# Patient Record
Sex: MALE | Race: BLACK OR AFRICAN AMERICAN | NOT HISPANIC OR LATINO | Employment: FULL TIME | URBAN - METROPOLITAN AREA
[De-identification: names, ages, dates, MRNs, and addresses within clinical notes are randomized per-mention and may not be internally consistent; named-entity substitution may affect disease eponyms.]

---

## 2023-04-24 ENCOUNTER — OFFICE VISIT (OUTPATIENT)
Dept: PRIMARY CARE | Facility: CLINIC | Age: 53
End: 2023-04-24
Payer: COMMERCIAL

## 2023-04-24 VITALS
SYSTOLIC BLOOD PRESSURE: 130 MMHG | HEART RATE: 70 BPM | DIASTOLIC BLOOD PRESSURE: 80 MMHG | BODY MASS INDEX: 35.29 KG/M2 | WEIGHT: 253 LBS

## 2023-04-24 DIAGNOSIS — D72.819 LEUKOPENIA, UNSPECIFIED TYPE: ICD-10-CM

## 2023-04-24 DIAGNOSIS — Z12.11 ENCOUNTER FOR SCREENING COLONOSCOPY: ICD-10-CM

## 2023-04-24 DIAGNOSIS — E78.2 HYPERLIPEMIA, MIXED: ICD-10-CM

## 2023-04-24 DIAGNOSIS — I10 BENIGN ESSENTIAL HYPERTENSION: ICD-10-CM

## 2023-04-24 DIAGNOSIS — R74.8 ABNORMAL ALKALINE PHOSPHATASE TEST: ICD-10-CM

## 2023-04-24 DIAGNOSIS — K62.5 RECTAL BLEEDING: Primary | ICD-10-CM

## 2023-04-24 DIAGNOSIS — N52.9 INABILITY TO ATTAIN ERECTION: ICD-10-CM

## 2023-04-24 DIAGNOSIS — J45.20 MILD INTERMITTENT ASTHMA WITHOUT COMPLICATION (HHS-HCC): ICD-10-CM

## 2023-04-24 DIAGNOSIS — R14.0 GASSINESS: ICD-10-CM

## 2023-04-24 DIAGNOSIS — M62.830 SPASM OF LUMBAR PARASPINOUS MUSCLE: ICD-10-CM

## 2023-04-24 PROBLEM — Z00.00 ANNUAL PHYSICAL EXAM: Status: ACTIVE | Noted: 2023-04-24

## 2023-04-24 PROBLEM — J45.909 ASTHMA (HHS-HCC): Status: ACTIVE | Noted: 2023-04-24

## 2023-04-24 PROCEDURE — 3075F SYST BP GE 130 - 139MM HG: CPT | Performed by: FAMILY MEDICINE

## 2023-04-24 PROCEDURE — 20553 NJX 1/MLT TRIGGER POINTS 3/>: CPT | Performed by: FAMILY MEDICINE

## 2023-04-24 PROCEDURE — 3079F DIAST BP 80-89 MM HG: CPT | Performed by: FAMILY MEDICINE

## 2023-04-24 PROCEDURE — 99215 OFFICE O/P EST HI 40 MIN: CPT | Performed by: FAMILY MEDICINE

## 2023-04-24 PROCEDURE — 1036F TOBACCO NON-USER: CPT | Performed by: FAMILY MEDICINE

## 2023-04-24 RX ORDER — OLMESARTAN MEDOXOMIL AND HYDROCHLOROTHIAZIDE 40/25 40; 25 MG/1; MG/1
1 TABLET ORAL DAILY
Qty: 90 TABLET | Refills: 1 | Status: SHIPPED | OUTPATIENT
Start: 2023-04-24 | End: 2024-02-15 | Stop reason: SDUPTHER

## 2023-04-24 RX ORDER — TADALAFIL 20 MG/1
TABLET ORAL
COMMUNITY
Start: 2020-08-19 | End: 2023-04-24 | Stop reason: SDUPTHER

## 2023-04-24 RX ORDER — ROSUVASTATIN CALCIUM 40 MG/1
40 TABLET, COATED ORAL DAILY
COMMUNITY
End: 2023-04-24 | Stop reason: SDUPTHER

## 2023-04-24 RX ORDER — TADALAFIL 20 MG/1
TABLET ORAL
Qty: 15 TABLET | Refills: 5 | Status: SHIPPED | OUTPATIENT
Start: 2023-04-24 | End: 2024-02-15 | Stop reason: SDUPTHER

## 2023-04-24 RX ORDER — MONTELUKAST SODIUM 10 MG/1
1 TABLET ORAL DAILY
COMMUNITY
Start: 2013-12-23

## 2023-04-24 RX ORDER — ALBUTEROL SULFATE 90 UG/1
AEROSOL, METERED RESPIRATORY (INHALATION)
COMMUNITY
Start: 2013-12-23 | End: 2024-02-15 | Stop reason: ALTCHOICE

## 2023-04-24 RX ORDER — OLMESARTAN MEDOXOMIL AND HYDROCHLOROTHIAZIDE 40/25 40; 25 MG/1; MG/1
1 TABLET ORAL DAILY
COMMUNITY
End: 2023-04-24 | Stop reason: SDUPTHER

## 2023-04-24 RX ORDER — ROSUVASTATIN CALCIUM 40 MG/1
40 TABLET, COATED ORAL DAILY
Qty: 90 TABLET | Refills: 1 | Status: SHIPPED | OUTPATIENT
Start: 2023-04-24 | End: 2024-02-15 | Stop reason: SDUPTHER

## 2023-04-24 RX ORDER — SILDENAFIL 100 MG/1
100 TABLET, FILM COATED ORAL DAILY PRN
Qty: 30 TABLET | Refills: 5 | Status: SHIPPED | OUTPATIENT
Start: 2023-04-24 | End: 2024-02-15 | Stop reason: ALTCHOICE

## 2023-04-24 ASSESSMENT — PATIENT HEALTH QUESTIONNAIRE - PHQ9
2. FEELING DOWN, DEPRESSED OR HOPELESS: NOT AT ALL
SUM OF ALL RESPONSES TO PHQ9 QUESTIONS 1 AND 2: 0
1. LITTLE INTEREST OR PLEASURE IN DOING THINGS: NOT AT ALL

## 2023-04-24 NOTE — ASSESSMENT & PLAN NOTE
Talked about a plethora of etiologies including fissure, hemorrhoids, polyps, pathology such as cancer  Plan for screening colonoscopy, so I will send requisition to GI, who will contact you and schedule appointment at your convenience    I do not believe that the bleeding was secondary to the use of gabapentin

## 2023-04-24 NOTE — ASSESSMENT & PLAN NOTE
Provided you with trigger point injections, hoping for both acute and long-term relief as it provided excellent benefit for you in the past

## 2023-04-24 NOTE — ASSESSMENT & PLAN NOTE
Sent over both Viagra and Cialis, can use interchangeably  Risks, benefits, and options of treatment(s) were discussed after reviewing all current medication(s) and drug allergy(ies)  I opted for the treatment that we discussed with instructions on the medication use for your underlying medical ailment(s)

## 2023-04-24 NOTE — ASSESSMENT & PLAN NOTE
You tend to run on the low side of normal, sometimes below normal with your WBC, so I will repeat that with your blood work which you anticipate having done within the next few days

## 2023-04-24 NOTE — PROGRESS NOTES
Subjective   Patient ID: Eugene Ovalles is a 52 y.o. male who presents for Rectal Bleeding (3 days with blood in stool, stopped taking gabapentin and then it cleared up).    HPI  1.  Rectal bleeding.  Had surgery to the left lower extremity in the past, so was placed on gabapentin  He is taking gabapentin up to about a year ago when he discontinued it on his own  Over the past week or so, the pain started to become exacerbated so he started taking it again for about 3 days, but noticed that he had some rectal bleeding so he stopped the medication, and the rectal bleeding has also stopped  Claims that he had a colonoscopy about 10 years ago, but I do not have record of it  Cologuard was done in November 2021, noted to be negative  Admits that he has been a bit gassy as of late, and feels like he does not always completely empty his bowels    2.  Hypertension.  Normotensive in intake, no reported chest pain or shortness of breath    3.  Hyperlipidemia.  Currently taking rosuvastatin, last lipid panel in September, was uncontrolled with cholesterol 223 and     4.  Intermittent asthma.  Currently stable, uses albuterol intermittently, montelukast daily    5.  Erectile dysfunction.  Requesting refill of both Viagra and Cialis, hoping to use those interchangeably because solitaire late, they do not seem to help    6.  Alkaline phosphatase elevation.  In 2019, alkaline phosphatase was 123, the only elevation in about 6 blood draws that we have    7.  Leukopenia.  WBC has been a bit low to low normal  Last WBC was done in September 2022, noted to be 4.1    8.  Lower back spasms.  Feeling some spasms in the lower back, and in the past we had provided some trigger point injections with excellent benefit, hoping to have the same today  Denies any radiation of pain in the lower extremities, no bowel or bladder dysfunction    Review of Systems  All pertinent positive symptoms are included in the history of present  illness.    All other systems have been reviewed and are negative and noncontributory to this patient's current ailments.    No Known Allergies    Immunization History   Administered Date(s) Administered    Pfizer Purple Cap SARS-CoV-2 08/04/2021, 08/26/2021       Objective   Visit Vitals  /80   Pulse 70   Wt 115 kg (253 lb)   BMI 35.29 kg/m²   Smoking Status Never   BSA 2.4 m²       Physical Exam  CONSTITUTIONAL - well nourished, well developed, looks like stated age, in no acute distress, not ill-appearing, and not tired appearing  SKIN - normal skin color and pigmentation, normal skin turgor without rash, lesions, or nodules visualized  HEAD - no trauma, normocephalic  EYES - pupils are equal and reactive to light, extraocular muscles are intact, and normal external exam  CHEST - clear to auscultation, no wheezing, no crackles and no rales, good effort  CARDIAC - regular rate and regular rhythm, no skipped beats, no murmur  ABDOMEN - no organomegaly, soft, nontender, nondistended, normal bowel sounds, no guarding/rebound/rigidity, negative McBurney sign and negative Liriano sign  EXTREMITIES - no edema, no deformities; lumbar spine with significant paraspinal muscle tenderness bilaterally at L3/L4 without spinous process tenderness  NEUROLOGICAL - normal gait, normal balance, normal motor, no ataxia, alert, oriented and no focal signs  PSYCHIATRIC - alert, pleasant and cordial, age-appropriate    Procedure:  Risks, benefits, and options of the trigger point injection(s) were discussed: 1% lidocaine (2 mL) and Kenalog 40 mg injected at 3 separate sites on left and 3 separate sites on right with 0.5 mL at each injection site into the muscle spasm/myositis as noted on the physical examination; successful without complication and tolerated extremely well    Assessment/Plan   Problem List Items Addressed This Visit       Abnormal alkaline phosphatase test     We will continue to monitor with routine blood  work  It has been normal in the past 3 blood draws, only abnormal one-time in April 2019 at 123 the rest have been impeccably normal         Relevant Orders    Lipid Panel    Comprehensive Metabolic Panel    CBC and Auto Differential    Asthma     Stable, medication refills or not necessary at this time         Benign essential hypertension     Stable, no changes to medication recommended  I would like to have you monitor and record blood pressures at home   Blood pressure goal should be below 130/80, ideally 120/80  If the blood pressure is too high or too low, we need to consider making adjustments to your antihypertensive therapy         Relevant Medications    olmesartan-hydrochlorothiazide (BENIcar HCT) 40-25 mg tablet    Other Relevant Orders    Lipid Panel    Comprehensive Metabolic Panel    CBC and Auto Differential    Hyperlipemia, mixed     Currently taking rosuvastatin, 40 mg daily, so I would like to have you continue that along with obtaining fasting blood work         Relevant Medications    rosuvastatin (Crestor) 40 mg tablet    Other Relevant Orders    Lipid Panel    Comprehensive Metabolic Panel    CBC and Auto Differential    Inability to attain erection     Sent over both Viagra and Cialis, can use interchangeably  Risks, benefits, and options of treatment(s) were discussed after reviewing all current medication(s) and drug allergy(ies)  I opted for the treatment that we discussed with instructions on the medication use for your underlying medical ailment(s)         Relevant Medications    sildenafil (Viagra) 100 mg tablet    tadalafil (Cialis) 20 mg tablet    Rectal bleeding - Primary     Talked about a plethora of etiologies including fissure, hemorrhoids, polyps, pathology such as cancer  Plan for screening colonoscopy, so I will send requisition to GI, who will contact you and schedule appointment at your convenience    I do not believe that the bleeding was secondary to the use of  gabapentin         Spasm of lumbar paraspinous muscle     Provided you with trigger point injections, hoping for both acute and long-term relief as it provided excellent benefit for you in the past         Leukopenia     You tend to run on the low side of normal, sometimes below normal with your WBC, so I will repeat that with your blood work which you anticipate having done within the next few days         Relevant Orders    CBC and Auto Differential    Gassiness     Suggested the use of yogurt as opposed to using some sort of medication such as Gas-X or Beano.  I believe a natural probiotic may help you more thoroughly than a synthetic medication          Other Visit Diagnoses       Encounter for screening colonoscopy        We will send requisition to GI in [place]  They should call you to set up an arrange at your earliest convenience    Relevant Orders    Colonoscopy

## 2023-04-24 NOTE — ASSESSMENT & PLAN NOTE
Currently taking rosuvastatin, 40 mg daily, so I would like to have you continue that along with obtaining fasting blood work

## 2023-04-24 NOTE — ASSESSMENT & PLAN NOTE
Suggested the use of yogurt as opposed to using some sort of medication such as Gas-X or Beano.  I believe a natural probiotic may help you more thoroughly than a synthetic medication

## 2023-04-24 NOTE — ASSESSMENT & PLAN NOTE
We will continue to monitor with routine blood work  It has been normal in the past 3 blood draws, only abnormal one-time in April 2019 at 123 the rest have been impeccably normal

## 2023-06-22 ENCOUNTER — TELEMEDICINE (OUTPATIENT)
Dept: PRIMARY CARE | Facility: CLINIC | Age: 53
End: 2023-06-22
Payer: COMMERCIAL

## 2023-06-22 ENCOUNTER — LAB (OUTPATIENT)
Dept: LAB | Facility: LAB | Age: 53
End: 2023-06-22
Payer: COMMERCIAL

## 2023-06-22 DIAGNOSIS — H93.8X3 EAR CONGESTION, BILATERAL: ICD-10-CM

## 2023-06-22 DIAGNOSIS — R09.81 NASAL CONGESTION: ICD-10-CM

## 2023-06-22 DIAGNOSIS — R05.1 ACUTE COUGH: ICD-10-CM

## 2023-06-22 DIAGNOSIS — J06.9 VIRAL UPPER RESPIRATORY TRACT INFECTION: Primary | ICD-10-CM

## 2023-06-22 DIAGNOSIS — J06.9 VIRAL UPPER RESPIRATORY TRACT INFECTION: ICD-10-CM

## 2023-06-22 PROBLEM — M25.572 ACUTE LEFT ANKLE PAIN: Status: ACTIVE | Noted: 2018-06-08

## 2023-06-22 PROBLEM — S93.432A ANKLE SYNDESMOSIS DISRUPTION, LEFT, INITIAL ENCOUNTER: Status: ACTIVE | Noted: 2018-03-13

## 2023-06-22 PROBLEM — R26.9 ABNORMALITY OF GAIT AND MOBILITY: Status: ACTIVE | Noted: 2018-06-08

## 2023-06-22 PROBLEM — S82.832A CLOSED FRACTURE OF LEFT DISTAL FIBULA: Status: ACTIVE | Noted: 2018-03-13

## 2023-06-22 PROBLEM — S82.839A FRACTURE OF DISTAL FIBULA: Status: ACTIVE | Noted: 2018-03-13

## 2023-06-22 LAB — SARS-COV-2 RESULT: NOT DETECTED

## 2023-06-22 PROCEDURE — 87635 SARS-COV-2 COVID-19 AMP PRB: CPT

## 2023-06-22 PROCEDURE — 99214 OFFICE O/P EST MOD 30 MIN: CPT | Performed by: FAMILY MEDICINE

## 2023-06-22 RX ORDER — POLYETHYLENE GLYCOL 3350, SODIUM CHLORIDE, SODIUM BICARBONATE, POTASSIUM CHLORIDE 420; 11.2; 5.72; 1.48 G/4L; G/4L; G/4L; G/4L
POWDER, FOR SOLUTION ORAL
COMMUNITY
Start: 2023-04-25

## 2023-06-22 RX ORDER — METHYLPREDNISOLONE 4 MG/1
TABLET ORAL
Qty: 21 TABLET | Refills: 0 | Status: SHIPPED | OUTPATIENT
Start: 2023-06-22 | End: 2024-02-15 | Stop reason: ALTCHOICE

## 2023-06-22 RX ORDER — DOXYCYCLINE 100 MG/1
100 CAPSULE ORAL 2 TIMES DAILY
Qty: 20 CAPSULE | Refills: 0 | Status: SHIPPED | OUTPATIENT
Start: 2023-06-22 | End: 2023-07-02

## 2023-06-22 ASSESSMENT — PATIENT HEALTH QUESTIONNAIRE - PHQ9
SUM OF ALL RESPONSES TO PHQ9 QUESTIONS 1 AND 2: 0
2. FEELING DOWN, DEPRESSED OR HOPELESS: NOT AT ALL
1. LITTLE INTEREST OR PLEASURE IN DOING THINGS: NOT AT ALL

## 2023-06-22 NOTE — PROGRESS NOTES
Subjective   Patient ID: Eugene Ovalles is a 53 y.o. male who presents for Upper Respiratory Infection.    HPI  Approximately 3 to 4 days ago, the patient experienced the onset of a cough, followed by nasal congestion and clogged ears. Yesterday, while coughing, the patient did notice a mild shortness of breath, but otherwise feels generally fine. It's worth noting that the patient has not felt the need to use his albuterol inhaler for his asthma symptoms. In an attempt to alleviate the symptoms, the patient has tried Aleve and NyQuil, which have provided mild relief. They have expressed their willingness to undergo COVID-19 testing at our office.    The patient denies experiencing loss of taste or smell, headaches, body aches, nausea, or vomiting.    After initially discussing the symptoms through Virtual Telemedicine, the appointment proceeded with an in-office visit upon the patient's arrival.    Review of Systems  All pertinent positive symptoms are included in the history of present illness.    All other systems have been reviewed and are negative and noncontributory to this patient's current ailments.    Current Outpatient Medications   Medication Instructions    albuterol 90 mcg/actuation inhaler inhalation    doxycycline (VIBRAMYCIN) 100 mg, oral, 2 times daily, Take with at least 8 ounces (large glass) of water, do not lie down for 30 minutes after    methylPREDNISolone (Medrol, Vincent,) 4 mg tablets Follow schedule on package instructions    montelukast (Singulair) 10 mg tablet 1 tablet, oral, Daily    olmesartan-hydrochlorothiazide (BENIcar HCT) 40-25 mg tablet 1 tablet, oral, Daily    polyethylene glycol-electrolytes 420 gram solution drink half by mouth beginning the night before the procedure and start drinking the 2nd half 5 hours before procedure time    rosuvastatin (CRESTOR) 40 mg, oral, Daily    sildenafil (VIAGRA) 100 mg, oral, Daily PRN    tadalafil (Cialis) 20 mg tablet Take 1 tablet every 36  hours as needed for erectile dysfunction     No Known Allergies    Immunization History   Administered Date(s) Administered    Pfizer Purple Cap SARS-CoV-2 08/04/2021, 08/26/2021     Past Surgical History:   Procedure Laterality Date    OTHER SURGICAL HISTORY  12/23/2013    Prior Surgical Procedure Not Done    OTHER SURGICAL HISTORY  12/02/2019    Fibula fracture repair     No family history on file.  Social History     Tobacco Use    Smoking status: Never    Smokeless tobacco: Never       Objective   Visit Vitals  Smoking Status Never       Physical Exam  CONSTITUTIONAL - mild ill appearing and pale.  SKIN - No lesions or rashes visualized. Good skin turgor.  EYES - EOMI, SLIM  ENT - EACs clear. TMs intact. Nasal turbinates with erythema and clear-yellow discharge. Uvula midline, oropharynx nonerythematous and without exudate.  NECK - Supple and without rigidity. Thyroid midline and without masses. No palpable lymph nodes.  HEAD - Atraumatic, normocephalic..  RESP - CTAB. No wheezing, rhonchi, or crackles.   CARDIAC - RRR. No murmurs, gallops, or rubs.    Assessment/Plan   Problem List Items Addressed This Visit    None  Visit Diagnoses       Viral upper respiratory tract infection    -  Primary    We discussed testing for COVID-19.   You'll be within the window to start Paxlovid if you are positive.    Relevant Medications    methylPREDNISolone (Medrol, Vincent,) 4 mg tablets    Other Relevant Orders    Sars-CoV-2 PCR, Symptomatic    Acute cough        I have provided a prescription for a Medrol Dosepak.  Please take as indicated.    Relevant Medications    doxycycline (Vibramycin) 100 mg capsule    methylPREDNISolone (Medrol, Vincent,) 4 mg tablets    Other Relevant Orders    Sars-CoV-2 PCR, Symptomatic    Nasal congestion        In case you are negative for COVID-19, I have provided a prescription for Doxycyline.  Please take as indicated.    Relevant Medications    doxycycline (Vibramycin) 100 mg capsule     methylPREDNISolone (Medrol, Vincent,) 4 mg tablets    Other Relevant Orders    Sars-CoV-2 PCR, Symptomatic    Ear congestion, bilateral        Relevant Medications    doxycycline (Vibramycin) 100 mg capsule    methylPREDNISolone (Medrol, Vincent,) 4 mg tablets    Other Relevant Orders    Sars-CoV-2 PCR, Symptomatic        If you start to develop any worsening of your symptoms, in particular, cardiac or respiratory symptoms such as increasing shortness of breath, chest tightness, chest pain, difficulty breathing, or wheezing, please get to the emergency department immediately for further evaluation and medical management

## 2023-06-23 NOTE — RESULT ENCOUNTER NOTE
Your Covid test result is negative.  It should be noted that a negative result does not necessarily preclude COVID-19 infection since the adequacy of the sample collection and/or a low viral amount may result in the negligible presence of the virus in regard to the sensitivity of the test.    Clearly, you communicated with us because you had symptoms of an illness, so please start taking the medication we have provided    If you start to develop any worsening of your symptoms, in particular, respiratory symptoms such as increasing shortness of breath, chest tightness, chest pain, difficulty breathing, or wheezing, please get to the emergency department immediately for further evaluation and medical management

## 2024-02-15 ENCOUNTER — OFFICE VISIT (OUTPATIENT)
Dept: PRIMARY CARE | Facility: CLINIC | Age: 54
End: 2024-02-15
Payer: COMMERCIAL

## 2024-02-15 VITALS
SYSTOLIC BLOOD PRESSURE: 128 MMHG | DIASTOLIC BLOOD PRESSURE: 80 MMHG | WEIGHT: 257 LBS | HEART RATE: 70 BPM | BODY MASS INDEX: 35.84 KG/M2

## 2024-02-15 DIAGNOSIS — I10 BENIGN ESSENTIAL HYPERTENSION: ICD-10-CM

## 2024-02-15 DIAGNOSIS — J45.20 MILD INTERMITTENT ASTHMA WITHOUT COMPLICATION (HHS-HCC): ICD-10-CM

## 2024-02-15 DIAGNOSIS — N52.9 INABILITY TO ATTAIN ERECTION: ICD-10-CM

## 2024-02-15 DIAGNOSIS — Z11.4 ENCOUNTER FOR SCREENING FOR HIV: ICD-10-CM

## 2024-02-15 DIAGNOSIS — Z12.5 PROSTATE CANCER SCREENING: ICD-10-CM

## 2024-02-15 DIAGNOSIS — Z11.59 NEED FOR HEPATITIS C SCREENING TEST: ICD-10-CM

## 2024-02-15 DIAGNOSIS — E78.2 HYPERLIPEMIA, MIXED: ICD-10-CM

## 2024-02-15 DIAGNOSIS — Z00.00 ANNUAL PHYSICAL EXAM: Primary | ICD-10-CM

## 2024-02-15 DIAGNOSIS — M62.830 SPASM OF LUMBAR PARASPINOUS MUSCLE: ICD-10-CM

## 2024-02-15 PROBLEM — S82.839A FRACTURE OF DISTAL FIBULA: Status: RESOLVED | Noted: 2018-03-13 | Resolved: 2024-02-15

## 2024-02-15 PROBLEM — S82.832A CLOSED FRACTURE OF LEFT DISTAL FIBULA: Status: RESOLVED | Noted: 2018-03-13 | Resolved: 2024-02-15

## 2024-02-15 PROBLEM — M25.572 ACUTE LEFT ANKLE PAIN: Status: RESOLVED | Noted: 2018-06-08 | Resolved: 2024-02-15

## 2024-02-15 PROBLEM — S93.432A ANKLE SYNDESMOSIS DISRUPTION, LEFT, INITIAL ENCOUNTER: Status: RESOLVED | Noted: 2018-03-13 | Resolved: 2024-02-15

## 2024-02-15 PROCEDURE — 99396 PREV VISIT EST AGE 40-64: CPT | Performed by: FAMILY MEDICINE

## 2024-02-15 PROCEDURE — 3074F SYST BP LT 130 MM HG: CPT | Performed by: FAMILY MEDICINE

## 2024-02-15 PROCEDURE — 3079F DIAST BP 80-89 MM HG: CPT | Performed by: FAMILY MEDICINE

## 2024-02-15 PROCEDURE — 99214 OFFICE O/P EST MOD 30 MIN: CPT | Performed by: FAMILY MEDICINE

## 2024-02-15 PROCEDURE — 1036F TOBACCO NON-USER: CPT | Performed by: FAMILY MEDICINE

## 2024-02-15 RX ORDER — ROSUVASTATIN CALCIUM 40 MG/1
40 TABLET, COATED ORAL DAILY
Qty: 90 TABLET | Refills: 1 | Status: SHIPPED | OUTPATIENT
Start: 2024-02-15 | End: 2024-08-13

## 2024-02-15 RX ORDER — MELOXICAM 7.5 MG/1
15 TABLET ORAL DAILY
Qty: 180 TABLET | Refills: 1 | Status: SHIPPED | OUTPATIENT
Start: 2024-02-15 | End: 2024-08-13

## 2024-02-15 RX ORDER — OLMESARTAN MEDOXOMIL AND HYDROCHLOROTHIAZIDE 40/25 40; 25 MG/1; MG/1
1 TABLET ORAL DAILY
Qty: 90 TABLET | Refills: 1 | Status: SHIPPED | OUTPATIENT
Start: 2024-02-15 | End: 2024-08-13

## 2024-02-15 RX ORDER — TADALAFIL 20 MG/1
TABLET ORAL
Qty: 15 TABLET | Refills: 5 | Status: SHIPPED | OUTPATIENT
Start: 2024-02-15 | End: 2024-04-24 | Stop reason: SDUPTHER

## 2024-02-15 ASSESSMENT — PATIENT HEALTH QUESTIONNAIRE - PHQ9
1. LITTLE INTEREST OR PLEASURE IN DOING THINGS: NOT AT ALL
SUM OF ALL RESPONSES TO PHQ9 QUESTIONS 1 AND 2: 0
2. FEELING DOWN, DEPRESSED OR HOPELESS: NOT AT ALL

## 2024-02-15 NOTE — ASSESSMENT & PLAN NOTE
A complete history and physical examination was completed at this visit.  Fasting blood work was obtained.   We will notify you once the results are available and make changes to your treatment plan accordingly.

## 2024-02-15 NOTE — PROGRESS NOTES
Subjective   Patient ID: Eugene Ovalles is a 53 y.o. male who presents for Hypertension and Erectile Dysfunction.    Past Medical, Surgical, and Family History reviewed and updated in chart.    Reviewed all medications by prescribing practitioner or clinical pharmacist (such as prescriptions, OTCs, herbal therapies and supplements) and documented in the medical record.    HPI  1.  Physical exam.   Colonoscopy: last colonoscopy was 5/2023 with a 5 year clearance. A tubular adenoma was removed.  Immunizations:  Needs T-dap     2.  Hypertension.  Currently on olmesartan-HCTZ 40-25 mg daily  Blood pressure in office is 140/80, repeat 128/80  No cardiovascular symptoms  Requesting refills today     3.  Hyperlipidemia.  Prescribed rosuvastatin 40 mg daily, but has been taking one every other day since last appointment in April 2023 because he was concerned medication was contributing to weight gain  Last lipid panel in September 2022 was uncontrolled with cholesterol 223 and   Not fasting for blood work but agreed to come in tomorrow morning for this  He has been working on losing weight with lifestyle changes and states he has lost 30 pounds over the past several months  No refills needed today     4.  Asthma.  Currently stable, no recent episodes  Does not use albuterol and uses montelukast intermittently   No refills needed today     5.  Erectile dysfunction.  Currently taking Viagra 100 mg and Cialis 20 mg as needed  States that Cialis works better than Viagra  No questions or concerns  Requesting refills of both today    6.  Low back pain.  Patient has a history of diffuse, dull low back pain and has taken meloxicam in the past to help with this  Pain is worse when getting up from a chair or after sitting for a while  Denies radiation, pinpoint tenderness, or loss of bowel/bladder function    Review of Systems  All pertinent positive symptoms are included in the history of present illness.    All other  systems have been reviewed and are negative and noncontributory to this patient's current ailments.    Past Medical History:   Diagnosis Date    Corns and callosities 01/18/2021    Callus of foot    Decreased libido 08/19/2020    Decreased libido    Other conditions influencing health status 01/07/2014    Myalgia and myositis    Strain of muscle, fascia and tendon of lower back, initial encounter 08/19/2020    Lumbosacral strain, initial encounter    Tinea unguium 12/23/2013    Onychomycosis of toenail     Past Surgical History:   Procedure Laterality Date    OTHER SURGICAL HISTORY  12/23/2013    Prior Surgical Procedure Not Done    OTHER SURGICAL HISTORY  12/02/2019    Fibula fracture repair     Social History     Tobacco Use    Smoking status: Never    Smokeless tobacco: Never     No family history on file.  Immunization History   Administered Date(s) Administered    Pfizer Purple Cap SARS-CoV-2 08/04/2021, 08/26/2021     Current Outpatient Medications   Medication Instructions    meloxicam (MOBIC) 15 mg, oral, Daily    montelukast (Singulair) 10 mg tablet 1 tablet, oral, Daily    olmesartan-hydrochlorothiazide (BENIcar HCT) 40-25 mg tablet 1 tablet, oral, Daily    polyethylene glycol-electrolytes 420 gram solution drink half by mouth beginning the night before the procedure and start drinking the 2nd half 5 hours before procedure time    rosuvastatin (CRESTOR) 40 mg, oral, Daily    tadalafil (Cialis) 20 mg tablet Take 1 tablet every 36 hours as needed for erectile dysfunction     No Known Allergies    Objective   Vitals:    02/15/24 1045 02/15/24 1105   BP: 140/80 128/80   Pulse: 70    Weight: 117 kg (257 lb)      Body mass index is 35.84 kg/m².    BP Readings from Last 3 Encounters:   02/15/24 128/80   04/24/23 130/80   09/01/22 124/80      Wt Readings from Last 3 Encounters:   02/15/24 117 kg (257 lb)   04/24/23 115 kg (253 lb)   09/01/22 118 kg (261 lb)        No visits with results within 1 Month(s) from this  visit.   Latest known visit with results is:   Lab on 06/22/2023   Component Date Value    SARS-CoV-2 Result 06/22/2023 NOT DETECTED      Physical Exam  CONSTITUTIONAL - well nourished, well developed, looks like stated age, in no acute distress, not ill-appearing, and not tired appearing  SKIN - normal skin color and pigmentation, normal skin turgor without rash, lesions, or nodules visualized  HEAD - no trauma, normocephalic  EYES - pupils are equal and reactive to light, extraocular muscles are intact, and normal external exam  NECK - supple without rigidity, no neck mass was observed  CHEST - clear to auscultation, no wheezing, no crackles and no rales, good effort  CARDIAC - regular rate and regular rhythm, no skipped beats, no murmur  EXTREMITIES - no obvious or evident edema, no obvious or evident deformities; unable to reproduce any specific tender point in the area of concern  NEUROLOGICAL - normal gait, normal balance, normal motor, no ataxia; alert, oriented and no focal signs  PSYCHIATRIC - alert, pleasant and cordial, age-appropriate    Assessment/Plan   Problem List Items Addressed This Visit       Asthma     Stable, please continue medication as prescribed.         Benign essential hypertension     A refill for your prescription has been sent to the pharmacy. I would recommend that you monitor your blood pressure at home with a goal of 130/80 but ideally if less than 120/80.         Relevant Medications    olmesartan-hydrochlorothiazide (BENIcar HCT) 40-25 mg tablet    Hyperlipemia, mixed     Please obtain your fasting blood work at your earliest convenience.  We will notify you of the results and make changes to treatment plan accordingly.          Relevant Medications    rosuvastatin (Crestor) 40 mg tablet    Inability to attain erection     Condition is stable. We discontinued the sildenafil and sent a refill for Cialis to the pharmacy.          Relevant Medications    tadalafil (Cialis) 20 mg  tablet    Spasm of lumbar paraspinous muscle     A prescription for meloxicam has been sent to the pharmacy to be used as needed for muscle spasms.          Relevant Medications    meloxicam (Mobic) 7.5 mg tablet    Annual physical exam - Primary     A complete history and physical examination was completed at this visit.  Fasting blood work was obtained.   We will notify you once the results are available and make changes to your treatment plan accordingly.          Relevant Orders    Lipid Panel    Prostate Specific Antigen    TSH with reflex to Free T4 if abnormal    Comprehensive Metabolic Panel    CBC and Auto Differential    HIV 1/2 Antigen/Antibody Screen with Reflex to Confirmation    Hepatitis C Antibody     Other Visit Diagnoses       Prostate cancer screening        Relevant Orders    Prostate Specific Antigen    Encounter for screening for HIV        Relevant Orders    HIV 1/2 Antigen/Antibody Screen with Reflex to Confirmation    Need for hepatitis C screening test        Relevant Orders    Hepatitis C Antibody

## 2024-02-15 NOTE — ASSESSMENT & PLAN NOTE
A prescription for meloxicam has been sent to the pharmacy to be used as needed for muscle spasms.

## 2024-02-15 NOTE — ASSESSMENT & PLAN NOTE
Please obtain your fasting blood work at your earliest convenience.  We will notify you of the results and make changes to treatment plan accordingly.

## 2024-02-15 NOTE — ASSESSMENT & PLAN NOTE
A refill for your prescription has been sent to the pharmacy. I would recommend that you monitor your blood pressure at home with a goal of 130/80 but ideally if less than 120/80.

## 2024-02-15 NOTE — ASSESSMENT & PLAN NOTE
Condition is stable. We discontinued the sildenafil and sent a refill for Cialis to the pharmacy.

## 2024-03-25 ENCOUNTER — LAB (OUTPATIENT)
Dept: LAB | Facility: LAB | Age: 54
End: 2024-03-25
Payer: COMMERCIAL

## 2024-03-25 DIAGNOSIS — Z11.59 NEED FOR HEPATITIS C SCREENING TEST: ICD-10-CM

## 2024-03-25 DIAGNOSIS — Z11.4 ENCOUNTER FOR SCREENING FOR HIV: ICD-10-CM

## 2024-03-25 DIAGNOSIS — Z00.00 ANNUAL PHYSICAL EXAM: ICD-10-CM

## 2024-03-25 DIAGNOSIS — Z12.5 PROSTATE CANCER SCREENING: ICD-10-CM

## 2024-03-25 LAB
ALBUMIN SERPL BCP-MCNC: 4.5 G/DL (ref 3.4–5)
ALP SERPL-CCNC: 77 U/L (ref 33–120)
ALT SERPL W P-5'-P-CCNC: 22 U/L (ref 10–52)
ANION GAP SERPL CALC-SCNC: 10 MMOL/L (ref 10–20)
AST SERPL W P-5'-P-CCNC: 25 U/L (ref 9–39)
BASOPHILS # BLD AUTO: 0.04 X10*3/UL (ref 0–0.1)
BASOPHILS NFR BLD AUTO: 0.9 %
BILIRUB SERPL-MCNC: 0.8 MG/DL (ref 0–1.2)
BUN SERPL-MCNC: 18 MG/DL (ref 6–23)
CALCIUM SERPL-MCNC: 9 MG/DL (ref 8.6–10.3)
CHLORIDE SERPL-SCNC: 101 MMOL/L (ref 98–107)
CHOLEST SERPL-MCNC: 230 MG/DL (ref 0–199)
CHOLESTEROL/HDL RATIO: 3.3
CO2 SERPL-SCNC: 31 MMOL/L (ref 21–32)
CREAT SERPL-MCNC: 1.06 MG/DL (ref 0.5–1.3)
EGFRCR SERPLBLD CKD-EPI 2021: 83 ML/MIN/1.73M*2
EOSINOPHIL # BLD AUTO: 0.13 X10*3/UL (ref 0–0.7)
EOSINOPHIL NFR BLD AUTO: 3.1 %
ERYTHROCYTE [DISTWIDTH] IN BLOOD BY AUTOMATED COUNT: 13.8 % (ref 11.5–14.5)
GLUCOSE SERPL-MCNC: 100 MG/DL (ref 74–99)
HCT VFR BLD AUTO: 47.6 % (ref 41–52)
HDLC SERPL-MCNC: 68.7 MG/DL
HGB BLD-MCNC: 15.7 G/DL (ref 13.5–17.5)
IMM GRANULOCYTES # BLD AUTO: 0.01 X10*3/UL (ref 0–0.7)
IMM GRANULOCYTES NFR BLD AUTO: 0.2 % (ref 0–0.9)
LDLC SERPL CALC-MCNC: 141 MG/DL
LYMPHOCYTES # BLD AUTO: 2.03 X10*3/UL (ref 1.2–4.8)
LYMPHOCYTES NFR BLD AUTO: 47.7 %
MCH RBC QN AUTO: 29.6 PG (ref 26–34)
MCHC RBC AUTO-ENTMCNC: 33 G/DL (ref 32–36)
MCV RBC AUTO: 90 FL (ref 80–100)
MONOCYTES # BLD AUTO: 0.33 X10*3/UL (ref 0.1–1)
MONOCYTES NFR BLD AUTO: 7.7 %
NEUTROPHILS # BLD AUTO: 1.72 X10*3/UL (ref 1.2–7.7)
NEUTROPHILS NFR BLD AUTO: 40.4 %
NON HDL CHOLESTEROL: 161 MG/DL (ref 0–149)
NRBC BLD-RTO: 0 /100 WBCS (ref 0–0)
PLATELET # BLD AUTO: 239 X10*3/UL (ref 150–450)
POTASSIUM SERPL-SCNC: 3.8 MMOL/L (ref 3.5–5.3)
PROT SERPL-MCNC: 7.2 G/DL (ref 6.4–8.2)
PSA SERPL-MCNC: 2.65 NG/ML
RBC # BLD AUTO: 5.31 X10*6/UL (ref 4.5–5.9)
SODIUM SERPL-SCNC: 138 MMOL/L (ref 136–145)
TRIGL SERPL-MCNC: 101 MG/DL (ref 0–149)
TSH SERPL-ACNC: 1.35 MIU/L (ref 0.44–3.98)
VLDL: 20 MG/DL (ref 0–40)
WBC # BLD AUTO: 4.3 X10*3/UL (ref 4.4–11.3)

## 2024-03-25 PROCEDURE — 36415 COLL VENOUS BLD VENIPUNCTURE: CPT

## 2024-03-25 PROCEDURE — 80061 LIPID PANEL: CPT

## 2024-03-25 PROCEDURE — 85025 COMPLETE CBC W/AUTO DIFF WBC: CPT

## 2024-03-25 PROCEDURE — 84153 ASSAY OF PSA TOTAL: CPT

## 2024-03-25 PROCEDURE — 80053 COMPREHEN METABOLIC PANEL: CPT

## 2024-03-25 PROCEDURE — 87389 HIV-1 AG W/HIV-1&-2 AB AG IA: CPT

## 2024-03-25 PROCEDURE — 84443 ASSAY THYROID STIM HORMONE: CPT

## 2024-03-25 PROCEDURE — 86803 HEPATITIS C AB TEST: CPT

## 2024-03-26 DIAGNOSIS — R97.20 INCREASED PROSTATE SPECIFIC ANTIGEN (PSA) VELOCITY: Primary | ICD-10-CM

## 2024-03-26 LAB
HCV AB SER QL: NONREACTIVE
HIV 1+2 AB+HIV1 P24 AG SERPL QL IA: NONREACTIVE

## 2024-03-27 NOTE — RESULT ENCOUNTER NOTE
Cholesterol 230, 68, 141, 101 previously 223, 66, 137, 100    Sugar, kidney, liver, electrolytes, HIV, hepatitis C, thyroid, negative    PSA 2.65 previously 0.71 so there has been a significant increase in the past year.  Based on the significant increase over the past year, I really think that you need to be seen by a urologist to determine if there is pathology here, i.e. prostate cancer    Complete blood cell count stable with WBC 4.3 previous 4.1, 5.1 and normal differential

## 2024-04-24 ENCOUNTER — OFFICE VISIT (OUTPATIENT)
Dept: UROLOGY | Facility: HOSPITAL | Age: 54
End: 2024-04-24
Payer: COMMERCIAL

## 2024-04-24 DIAGNOSIS — N52.9 INABILITY TO ATTAIN ERECTION: ICD-10-CM

## 2024-04-24 DIAGNOSIS — R97.20 INCREASED PROSTATE SPECIFIC ANTIGEN (PSA) VELOCITY: Primary | ICD-10-CM

## 2024-04-24 LAB
POC APPEARANCE, URINE: CLEAR
POC BILIRUBIN, URINE: NEGATIVE
POC BLOOD, URINE: NEGATIVE
POC COLOR, URINE: YELLOW
POC GLUCOSE, URINE: NEGATIVE MG/DL
POC KETONES, URINE: NEGATIVE MG/DL
POC LEUKOCYTES, URINE: NEGATIVE
POC NITRITE,URINE: NEGATIVE
POC PH, URINE: 5.5 PH
POC PROTEIN, URINE: NEGATIVE MG/DL
POC SPECIFIC GRAVITY, URINE: 1.02
POC UROBILINOGEN, URINE: 0.2 EU/DL

## 2024-04-24 PROCEDURE — 99204 OFFICE O/P NEW MOD 45 MIN: CPT | Performed by: STUDENT IN AN ORGANIZED HEALTH CARE EDUCATION/TRAINING PROGRAM

## 2024-04-24 PROCEDURE — 99214 OFFICE O/P EST MOD 30 MIN: CPT | Performed by: STUDENT IN AN ORGANIZED HEALTH CARE EDUCATION/TRAINING PROGRAM

## 2024-04-24 PROCEDURE — 81003 URINALYSIS AUTO W/O SCOPE: CPT | Performed by: STUDENT IN AN ORGANIZED HEALTH CARE EDUCATION/TRAINING PROGRAM

## 2024-04-24 RX ORDER — TADALAFIL 20 MG/1
TABLET ORAL
Qty: 15 TABLET | Refills: 5 | Status: SHIPPED | OUTPATIENT
Start: 2024-04-24

## 2024-04-24 NOTE — PROGRESS NOTES
Subjective   Patient ID: Eugene Ovalles is a 53 y.o. male    HPI  53 y.o. male who presented to the clinic with concerns regarding erectile dysfunction (ED) and a recent increase in his PSA levels. He reported some issues with erections, which is why he was referred to our clinic. His recent blood tests showed a PSA level of 2.6, up from 0.7 a year ago. Although still below the threshold of 4, the significant jump from previous years is concerning and warrants closer monitoring. Mr. Knight denies any recent infections or conditions like prostatitis or COVID-19 that could have influenced his PSA levels. He has no family history of prostate cancer. Regarding his ED, he mentioned it is not significantly bothersome and prefers to manage it with as-needed medication rather than daily treatment.    The most recent PSA, conducted on 3/25/2024, revealed 2.65 ng/ml     Review of Systems    All systems were reviewed. Anything negative was noted in the HPI.    Objective   Physical Exam    General: Well developed, well nourished, alert and cooperative, appears in no acute distress   Eyes: Non-injected conjunctiva, sclera clear, no proptosis   Cardiac: Extremities are warm and well perfused. No edema, cyanosis or pallor   Lungs: Breathing is easy, non-labored. Speaking in clear and complete sentences. Normal diaphragmatic movement   MSK: Ambulatory with steady gait, unassisted   Neuro: Alert and oriented to person, place, and time   Psych: Demonstrates good judgment and reason, without hallucinations, abnormal affect or abnormal behaviors   Skin: No obvious lesions, no rashes       No CVA tenderness bilaterally   No suprapubic pain or discomfort       Past Medical History:   Diagnosis Date    Corns and callosities 01/18/2021    Callus of foot    Decreased libido 08/19/2020    Decreased libido    Other conditions influencing health status 01/07/2014    Myalgia and myositis    Strain of muscle, fascia and tendon of lower back,  initial encounter 08/19/2020    Lumbosacral strain, initial encounter    Tinea unguium 12/23/2013    Onychomycosis of toenail         Past Surgical History:   Procedure Laterality Date    OTHER SURGICAL HISTORY  12/23/2013    Prior Surgical Procedure Not Done    OTHER SURGICAL HISTORY  12/02/2019    Fibula fracture repair           Assessment/Plan   Erectile Dysfunction, high PSA doubling time     53 y.o. male who presents for the above condition, We had a very long and extensive discussion with the patient regarding the pathophysiology, differential diagnosis, risk factor, and management of ED. We discussed at length mechanism of action, risk, benefit, potential complication, adverse events of PDE 5 inhibitors in the form of Tadalafil 20 mg as needed. Instructed the patient to stop the medication in case of any side effects.       We will re-evaluate the efficacy of the medication in six months and consider alternative treatments if necessary. Additionally, we will repeat the PSA test in six months to monitor the levels closely. If the PSA level approaches 4, further diagnostic measures such as an MRI of the prostate may be considered.    Plan:  - Tadalafil 20 mg PRN  - Follow up in 6 months with PSA        4/24/2024    Scribe Attestation  By signing my name below, IMelinda Scribe   attest that this documentation has been prepared under the direction and in the presence of Dr. Sergo Zacarias

## 2024-10-23 ENCOUNTER — APPOINTMENT (OUTPATIENT)
Dept: UROLOGY | Facility: HOSPITAL | Age: 54
End: 2024-10-23
Payer: COMMERCIAL

## 2024-12-10 ENCOUNTER — TELEPHONE (OUTPATIENT)
Dept: PRIMARY CARE | Facility: CLINIC | Age: 54
End: 2024-12-10
Payer: COMMERCIAL

## 2024-12-10 DIAGNOSIS — I10 BENIGN ESSENTIAL HYPERTENSION: ICD-10-CM

## 2024-12-11 RX ORDER — OLMESARTAN MEDOXOMIL AND HYDROCHLOROTHIAZIDE 40/25 40; 25 MG/1; MG/1
1 TABLET ORAL DAILY
Qty: 30 TABLET | Refills: 0 | Status: SHIPPED | OUTPATIENT
Start: 2024-12-11 | End: 2025-01-10

## 2025-01-06 ENCOUNTER — APPOINTMENT (OUTPATIENT)
Dept: PRIMARY CARE | Facility: CLINIC | Age: 55
End: 2025-01-06
Payer: COMMERCIAL

## 2025-01-06 ENCOUNTER — LAB (OUTPATIENT)
Dept: LAB | Facility: LAB | Age: 55
End: 2025-01-06
Payer: COMMERCIAL

## 2025-01-06 VITALS
SYSTOLIC BLOOD PRESSURE: 128 MMHG | DIASTOLIC BLOOD PRESSURE: 80 MMHG | HEART RATE: 66 BPM | HEIGHT: 71 IN | WEIGHT: 256 LBS | BODY MASS INDEX: 35.84 KG/M2

## 2025-01-06 DIAGNOSIS — E78.2 HYPERLIPEMIA, MIXED: ICD-10-CM

## 2025-01-06 DIAGNOSIS — N52.9 INABILITY TO ATTAIN ERECTION: Primary | ICD-10-CM

## 2025-01-06 DIAGNOSIS — I10 BENIGN ESSENTIAL HYPERTENSION: ICD-10-CM

## 2025-01-06 DIAGNOSIS — R10.32 LEFT LOWER QUADRANT ABDOMINAL PAIN: ICD-10-CM

## 2025-01-06 LAB
ALBUMIN SERPL BCP-MCNC: 4.8 G/DL (ref 3.4–5)
ALP SERPL-CCNC: 90 U/L (ref 33–120)
ALT SERPL W P-5'-P-CCNC: 20 U/L (ref 10–52)
ANION GAP SERPL CALC-SCNC: 12 MMOL/L (ref 10–20)
AST SERPL W P-5'-P-CCNC: 17 U/L (ref 9–39)
BILIRUB SERPL-MCNC: 0.7 MG/DL (ref 0–1.2)
BUN SERPL-MCNC: 27 MG/DL (ref 6–23)
CALCIUM SERPL-MCNC: 9.5 MG/DL (ref 8.6–10.3)
CHLORIDE SERPL-SCNC: 100 MMOL/L (ref 98–107)
CHOLEST SERPL-MCNC: 257 MG/DL (ref 0–199)
CHOLESTEROL/HDL RATIO: 4.4
CO2 SERPL-SCNC: 31 MMOL/L (ref 21–32)
CREAT SERPL-MCNC: 1.29 MG/DL (ref 0.5–1.3)
EGFRCR SERPLBLD CKD-EPI 2021: 65 ML/MIN/1.73M*2
GLUCOSE SERPL-MCNC: 83 MG/DL (ref 74–99)
HDLC SERPL-MCNC: 58.6 MG/DL
LDLC SERPL CALC-MCNC: 175 MG/DL
NON HDL CHOLESTEROL: 198 MG/DL (ref 0–149)
POTASSIUM SERPL-SCNC: 4 MMOL/L (ref 3.5–5.3)
PROT SERPL-MCNC: 7.8 G/DL (ref 6.4–8.2)
SODIUM SERPL-SCNC: 139 MMOL/L (ref 136–145)
TRIGL SERPL-MCNC: 116 MG/DL (ref 0–149)
VLDL: 23 MG/DL (ref 0–40)

## 2025-01-06 PROCEDURE — 80053 COMPREHEN METABOLIC PANEL: CPT

## 2025-01-06 PROCEDURE — 3008F BODY MASS INDEX DOCD: CPT | Performed by: FAMILY MEDICINE

## 2025-01-06 PROCEDURE — 3074F SYST BP LT 130 MM HG: CPT | Performed by: FAMILY MEDICINE

## 2025-01-06 PROCEDURE — 3079F DIAST BP 80-89 MM HG: CPT | Performed by: FAMILY MEDICINE

## 2025-01-06 PROCEDURE — 1036F TOBACCO NON-USER: CPT | Performed by: FAMILY MEDICINE

## 2025-01-06 PROCEDURE — 99214 OFFICE O/P EST MOD 30 MIN: CPT | Performed by: FAMILY MEDICINE

## 2025-01-06 PROCEDURE — 80061 LIPID PANEL: CPT

## 2025-01-06 RX ORDER — TADALAFIL 5 MG/1
5 TABLET ORAL DAILY
Qty: 30 TABLET | Refills: 0 | Status: SHIPPED | OUTPATIENT
Start: 2025-01-06 | End: 2025-01-06

## 2025-01-06 RX ORDER — ROSUVASTATIN CALCIUM 40 MG/1
40 TABLET, COATED ORAL DAILY
Qty: 90 TABLET | Refills: 1 | Status: SHIPPED | OUTPATIENT
Start: 2025-01-06 | End: 2025-07-05

## 2025-01-06 RX ORDER — TADALAFIL 5 MG/1
5 TABLET ORAL DAILY
Qty: 30 TABLET | Refills: 5 | Status: SHIPPED | OUTPATIENT
Start: 2025-01-06 | End: 2025-07-05

## 2025-01-06 RX ORDER — SILDENAFIL 100 MG/1
100 TABLET, FILM COATED ORAL DAILY PRN
Qty: 20 TABLET | Refills: 5 | Status: SHIPPED | OUTPATIENT
Start: 2025-01-06

## 2025-01-06 RX ORDER — OLMESARTAN MEDOXOMIL AND HYDROCHLOROTHIAZIDE 40/25 40; 25 MG/1; MG/1
1 TABLET ORAL DAILY
Qty: 30 TABLET | Refills: 3 | Status: SHIPPED | OUTPATIENT
Start: 2025-01-06 | End: 2025-05-06

## 2025-01-06 RX ORDER — SILDENAFIL 100 MG/1
100 TABLET, FILM COATED ORAL DAILY PRN
Qty: 12 TABLET | Refills: 1 | Status: SHIPPED | OUTPATIENT
Start: 2025-01-06 | End: 2025-01-06

## 2025-01-06 ASSESSMENT — PATIENT HEALTH QUESTIONNAIRE - PHQ9
1. LITTLE INTEREST OR PLEASURE IN DOING THINGS: NOT AT ALL
2. FEELING DOWN, DEPRESSED OR HOPELESS: NOT AT ALL
SUM OF ALL RESPONSES TO PHQ9 QUESTIONS 1 AND 2: 0

## 2025-01-06 NOTE — ASSESSMENT & PLAN NOTE
Please obtain your fasting blood work at your earliest convenience.  We will notify you of the results and make changes to treatment plan accordingly.   Refill sent to your pharmacy, please take medication daily  Try to implement the plan we discussed today with dietary and lifestyle changes

## 2025-01-06 NOTE — PROGRESS NOTES
Subjective   Patient ID: Eugene Ovalles is a 54 y.o. male who presents for Hypertension and Erectile Dysfunction.    Past Medical, Surgical, and Family History reviewed and updated in chart.    Reviewed all medications by prescribing practitioner or clinical pharmacist (such as prescriptions, OTCs, herbal therapies and supplements) and documented in the medical record.    HPI  1. Hyperlipidemia (HLD)    Eugene has not been taking Crestor for a few months due to concerns about weight gain. He is interested in focusing on diet and exercise to achieve weight loss.    2. Hypertension (HTN)    Eugene's blood pressure is stable on his current regimen and was well-controlled during intake. He reports no side effects and requires a refill of his medication.    3. Erectile Dysfunction (ED)    Eugene has previously tried Cialis and Viagra. While he is able to achieve an erection, he experiences difficulty maintaining it. He reports engaging in sexual intercourse approximately 20 days per month and experiences these symptoms regularly. He does not report any side effects from the medications and is interested in trying a combination therapy.    4. Left Lower Quadrant Abdominal Pain    Eugene experiences inconsistent pain in the left lower quadrant, which is not reproducible. He has had previous colonoscopies with negative results. He is concerned about a possible hernia but does not report any protrusions, enlargement of the scrotum, or pain during prolonged standing.    Review of Systems  All pertinent positive symptoms are included in the history of present illness.    All other systems have been reviewed and are negative and noncontributory to this patient's current ailments.    Past Medical History:   Diagnosis Date    Corns and callosities 01/18/2021    Callus of foot    Decreased libido 08/19/2020    Decreased libido    Other conditions influencing health status 01/07/2014    Myalgia and myositis    Strain of  "muscle, fascia and tendon of lower back, initial encounter 08/19/2020    Lumbosacral strain, initial encounter    Tinea unguium 12/23/2013    Onychomycosis of toenail     Past Surgical History:   Procedure Laterality Date    OTHER SURGICAL HISTORY  12/23/2013    Prior Surgical Procedure Not Done    OTHER SURGICAL HISTORY  12/02/2019    Fibula fracture repair     Social History     Tobacco Use    Smoking status: Never    Smokeless tobacco: Never     No family history on file.  Immunization History   Administered Date(s) Administered    COVID-19, mRNA, LNP-S, PF, 30 mcg/0.3 mL dose 08/04/2021, 08/26/2021     Current Outpatient Medications   Medication Instructions    montelukast (Singulair) 10 mg tablet 1 tablet, oral, Daily    olmesartan-hydrochlorothiazide (BENIcar HCT) 40-25 mg tablet 1 tablet, oral, Daily    polyethylene glycol-electrolytes 420 gram solution drink half by mouth beginning the night before the procedure and start drinking the 2nd half 5 hours before procedure time    rosuvastatin (CRESTOR) 40 mg, oral, Daily    sildenafil (VIAGRA) 100 mg, oral, Daily PRN    tadalafil (CIALIS) 5 mg, oral, Daily     No Known Allergies    Objective   Vitals:    01/06/25 1323   BP: 128/80   Pulse: 66   Weight: 116 kg (256 lb)   Height: 1.803 m (5' 11\")     Body mass index is 35.7 kg/m².    BP Readings from Last 3 Encounters:   01/06/25 128/80   02/15/24 128/80   04/24/23 130/80      Wt Readings from Last 3 Encounters:   01/06/25 116 kg (256 lb)   02/15/24 117 kg (257 lb)   04/24/23 115 kg (253 lb)      Physical Exam  CONSTITUTIONAL - well nourished, well developed, looks like stated age, in no acute distress, not ill-appearing, and not tired appearing  SKIN - normal skin color and pigmentation, normal skin turgor without rash, lesions, or nodules visualized  HEAD - no trauma, normocephalic  EYES - pupils are equal and reactive to light, extraocular muscles are intact, and normal external exam  NECK - supple without " rigidity, no neck mass was observed, no thyromegaly or thyroid nodules  CHEST - clear to auscultation, no wheezing, no crackles and no rales, good effort  CARDIAC - regular rate and regular rhythm, no skipped beats, no murmur  ABDOMEN - no organomegaly, soft, nontender, nondistended, normal bowel sounds, no guarding/rebound/rigidity  EXTREMITIES - no obvious or evident edema, no obvious or evident deformities  NEUROLOGICAL - normal gait, normal balance, normal motor, no ataxia, alert, oriented and no focal signs  PSYCHIATRIC - alert, pleasant and cordial, age-appropriate  IMMUNOLOGIC - no cervical lymphadenopathy    Assessment/Plan   Problem List Items Addressed This Visit       Benign essential hypertension     A refill for your prescription has been sent to the pharmacy. I would recommend that you monitor your blood pressure at home with a goal of 130/80 but ideally if less than 120/80.         Relevant Medications    olmesartan-hydrochlorothiazide (BENIcar HCT) 40-25 mg tablet    Hyperlipemia, mixed     Please obtain your fasting blood work at your earliest convenience.  We will notify you of the results and make changes to treatment plan accordingly.   Refill sent to your pharmacy, please take medication daily  Try to implement the plan we discussed today with dietary and lifestyle changes         Relevant Medications    rosuvastatin (Crestor) 40 mg tablet    Other Relevant Orders    Lipid Panel    Comprehensive Metabolic Panel    Inability to attain erection - Primary     We will trial with the new combination plan with Cialis 5mg daily and as needed viagra 100mg         Relevant Medications    tadalafil (Cialis) 5 mg tablet    sildenafil (Viagra) 100 mg tablet    Left lower quadrant abdominal pain     CTAP ordered today to rule out hernia   We will follow up once results return         Relevant Orders    CT abdomen pelvis w and wo IV contrast

## 2025-01-07 ENCOUNTER — HOSPITAL ENCOUNTER (OUTPATIENT)
Dept: RADIOLOGY | Facility: HOSPITAL | Age: 55
Discharge: HOME | End: 2025-01-07
Payer: COMMERCIAL

## 2025-01-07 DIAGNOSIS — R10.32 LEFT LOWER QUADRANT ABDOMINAL PAIN: ICD-10-CM

## 2025-01-07 PROCEDURE — 74176 CT ABD & PELVIS W/O CONTRAST: CPT

## 2025-01-07 PROCEDURE — 74176 CT ABD & PELVIS W/O CONTRAST: CPT | Performed by: RADIOLOGY

## 2025-01-07 PROCEDURE — 2550000001 HC RX 255 CONTRASTS

## 2025-01-07 PROCEDURE — A9698 NON-RAD CONTRAST MATERIALNOC: HCPCS

## 2025-01-07 RX ADMIN — IOHEXOL 500 ML: 12 SOLUTION ORAL at 17:20

## 2025-01-07 NOTE — RESULT ENCOUNTER NOTE
Cholesterol 257, 58, 175, 116 previously 230, 68, 141, 101.  This is much too high, so please take the rosuvastatin 40 mg every day and please make sure you take it compliantly  Sugar, electrolytes, liver normal  Kidney function shows some slight dehydration at time of blood draw

## 2025-01-08 ENCOUNTER — PATIENT MESSAGE (OUTPATIENT)
Dept: PRIMARY CARE | Facility: CLINIC | Age: 55
End: 2025-01-08
Payer: COMMERCIAL

## 2025-01-08 DIAGNOSIS — R10.32 LEFT LOWER QUADRANT ABDOMINAL PAIN: Primary | ICD-10-CM

## 2025-01-08 DIAGNOSIS — K42.9 UMBILICAL HERNIA WITHOUT OBSTRUCTION AND WITHOUT GANGRENE: ICD-10-CM

## 2025-01-08 DIAGNOSIS — K40.20 NON-RECURRENT BILATERAL INGUINAL HERNIA WITHOUT OBSTRUCTION OR GANGRENE: Primary | ICD-10-CM

## 2025-01-08 NOTE — RESULT ENCOUNTER NOTE
CT scan reveals a small umbilical hernia, and bilateral fat-containing hernias in both lower abdominal areas.  He also have a moderate amount of stool that is impacting the bowel wall so these could all be sources of your discomfort.  I am going to set you up with a general surgeon to talk about treatment options which may include surgical intervention.  You can schedule the appointment via the  [employer] application or contact them on the phone number provided

## 2025-01-10 ENCOUNTER — OFFICE VISIT (OUTPATIENT)
Dept: SURGERY | Facility: CLINIC | Age: 55
End: 2025-01-10
Payer: COMMERCIAL

## 2025-01-10 VITALS
HEIGHT: 71 IN | SYSTOLIC BLOOD PRESSURE: 110 MMHG | HEART RATE: 73 BPM | OXYGEN SATURATION: 97 % | BODY MASS INDEX: 35.7 KG/M2 | DIASTOLIC BLOOD PRESSURE: 68 MMHG

## 2025-01-10 DIAGNOSIS — K42.9 UMBILICAL HERNIA WITHOUT OBSTRUCTION AND WITHOUT GANGRENE: ICD-10-CM

## 2025-01-10 DIAGNOSIS — K40.20 NON-RECURRENT BILATERAL INGUINAL HERNIA WITHOUT OBSTRUCTION OR GANGRENE: Primary | ICD-10-CM

## 2025-01-10 PROCEDURE — 99203 OFFICE O/P NEW LOW 30 MIN: CPT | Performed by: SURGERY

## 2025-01-10 PROCEDURE — 3078F DIAST BP <80 MM HG: CPT | Performed by: SURGERY

## 2025-01-10 PROCEDURE — 1036F TOBACCO NON-USER: CPT | Performed by: SURGERY

## 2025-01-10 PROCEDURE — 99213 OFFICE O/P EST LOW 20 MIN: CPT | Performed by: SURGERY

## 2025-01-10 PROCEDURE — 3074F SYST BP LT 130 MM HG: CPT | Performed by: SURGERY

## 2025-01-10 ASSESSMENT — PAIN SCALES - GENERAL: PAINLEVEL_OUTOF10: 8

## 2025-01-10 NOTE — PROGRESS NOTES
Subjective   Patient ID: Eugene Ovalles is a 54 y.o. male who presents for Hernia.  HPI  Patient is a 54-year-old gentleman who is referred for evaluation treatment of bilateral inguinal hernias.  For approximately 1 year he has noted some pain in both the right and left groin regions.  Over the past several weeks the pain is intensified, made worse with lifting and movement (right worse than left).  He was sent for CT scan that shows bilateral, fat-containing inguinal hernias as well as a small umbilical hernia.    Review of Systems  On review of systems patient denies any weight loss, fever, change in bowel habits, melena, hematochezia, coronary artery disease, hypertension, TIA/CVA, bleeding, jaundice, pancreatitis, hepatitis.    Patient does not smoke. Patient does occasionally drink alcohol.      Past Medical History:   Diagnosis Date    Corns and callosities 01/18/2021    Callus of foot    Decreased libido 08/19/2020    Decreased libido    Other conditions influencing health status 01/07/2014    Myalgia and myositis    Strain of muscle, fascia and tendon of lower back, initial encounter 08/19/2020    Lumbosacral strain, initial encounter    Tinea unguium 12/23/2013    Onychomycosis of toenail        Past Surgical History:   Procedure Laterality Date    OTHER SURGICAL HISTORY  12/23/2013    Prior Surgical Procedure Not Done    OTHER SURGICAL HISTORY  12/02/2019    Fibula fracture repair            Objective   === 01/07/25 ===    CT ABDOMEN AND PELVIS W ORAL CONTRAST ONLY    - Impression -  Small umbilical hernia contains fat. Bilateral inguinal hernias  containing fat. No surrounding inflammatory changes or fluid is seen.  There is no herniation of bowel during the exam.       Physical Exam    Mild obese, well-developed. In no distress  Alert and oriented x 3  Lungs are clear to auscultation bilaterally.  Cardiac exam is regular rhythm and rate.  Abdomen is soft nontender nondistended. Small umbilical hernia  that reduces [email or web address]   Neurologic exam is without focal deficit.   Subtle bilateral inguinal hernia noted with cough and valsalva  Assessment/Plan   Diagnoses and all orders for this visit:  Non-recurrent bilateral inguinal hernia without obstruction or gangrene  -     Referral to General Surgery  -     Case Request Operating Room: REPAIR, HERNIA, INGUINAL, ROBOT-ASSISTED; Standing  -     Request for Pre-Admission Testing Visit; Future  Umbilical hernia without obstruction and without gangrene  -     Referral to General Surgery  Other orders  -     Place in outpatient/hospital ambulatory surgery; Standing  -     Full code; Standing  -     NPO Diet Except: Sips with meds; Effective now; Standing  -     Height and weight; Standing  -     Insert and maintain peripheral IV; Standing  -     Saline lock IV; Standing  -     Type And Screen; Standing  -     Inpatient consult to Respiratory Care; Standing       Impression: Symptomatic bilateral inguinal hernias ( right worse than left).  Recommend robotic bilateral inguinal hernia repair with mesh.  We can also repair a small umbilical hernia primarily.  We discussed the procedure to include risk, benefits and alternatives.  He agrees to the operation

## 2025-01-13 ENCOUNTER — TELEPHONE (OUTPATIENT)
Dept: PRIMARY CARE | Facility: CLINIC | Age: 55
End: 2025-01-13
Payer: COMMERCIAL

## 2025-01-13 RX ORDER — IBUPROFEN 800 MG/1
800 TABLET ORAL 3 TIMES DAILY PRN
Qty: 30 TABLET | Refills: 0 | Status: SHIPPED | OUTPATIENT
Start: 2025-01-13

## 2025-01-13 NOTE — TELEPHONE ENCOUNTER
Has apt with Dr. Moreland in may and pt is wondering if there is anyway we have pull to have him be seen sooner, I told him most likely not however I wanted to reach out to you just in case !

## 2025-01-16 ENCOUNTER — TELEPHONE (OUTPATIENT)
Dept: SURGERY | Facility: CLINIC | Age: 55
End: 2025-01-16
Payer: COMMERCIAL

## 2025-01-16 NOTE — TELEPHONE ENCOUNTER
Patient left message inquiring if he can drop off FMLA forms.  I returned call to patient to inform him that he can drop forms off at [employer], [address]. I informed patient to fill out employee section and sign authorization to release medical information. Patient understood. He will drop off today or tomorrow. Surgery is 1/31/2025.

## 2025-01-21 ENCOUNTER — CLINICAL SUPPORT (OUTPATIENT)
Dept: PREADMISSION TESTING | Facility: HOSPITAL | Age: 55
End: 2025-01-21
Payer: COMMERCIAL

## 2025-01-21 DIAGNOSIS — K40.20 NON-RECURRENT BILATERAL INGUINAL HERNIA WITHOUT OBSTRUCTION OR GANGRENE: ICD-10-CM

## 2025-01-24 ENCOUNTER — PRE-ADMISSION TESTING (OUTPATIENT)
Dept: PREADMISSION TESTING | Facility: HOSPITAL | Age: 55
End: 2025-01-24
Payer: COMMERCIAL

## 2025-01-24 ENCOUNTER — LAB (OUTPATIENT)
Dept: LAB | Facility: LAB | Age: 55
End: 2025-01-24
Payer: COMMERCIAL

## 2025-01-24 VITALS
HEART RATE: 80 BPM | RESPIRATION RATE: 20 BRPM | DIASTOLIC BLOOD PRESSURE: 84 MMHG | SYSTOLIC BLOOD PRESSURE: 138 MMHG | TEMPERATURE: 99.7 F | HEIGHT: 71 IN | OXYGEN SATURATION: 96 % | BODY MASS INDEX: 36.27 KG/M2 | WEIGHT: 259.04 LBS

## 2025-01-24 DIAGNOSIS — Z01.818 PREOP TESTING: ICD-10-CM

## 2025-01-24 DIAGNOSIS — Z01.818 PREOP TESTING: Primary | ICD-10-CM

## 2025-01-24 DIAGNOSIS — R97.20 INCREASED PROSTATE SPECIFIC ANTIGEN (PSA) VELOCITY: ICD-10-CM

## 2025-01-24 LAB
BASOPHILS # BLD AUTO: 0.04 X10*3/UL (ref 0–0.1)
BASOPHILS NFR BLD AUTO: 0.7 %
EOSINOPHIL # BLD AUTO: 0.28 X10*3/UL (ref 0–0.7)
EOSINOPHIL NFR BLD AUTO: 5.2 %
ERYTHROCYTE [DISTWIDTH] IN BLOOD BY AUTOMATED COUNT: 12.5 % (ref 11.5–14.5)
HCT VFR BLD AUTO: 40.9 % (ref 41–52)
HGB BLD-MCNC: 14 G/DL (ref 13.5–17.5)
IMM GRANULOCYTES # BLD AUTO: 0.02 X10*3/UL (ref 0–0.7)
IMM GRANULOCYTES NFR BLD AUTO: 0.4 % (ref 0–0.9)
LYMPHOCYTES # BLD AUTO: 1.79 X10*3/UL (ref 1.2–4.8)
LYMPHOCYTES NFR BLD AUTO: 33.4 %
MCH RBC QN AUTO: 29.9 PG (ref 26–34)
MCHC RBC AUTO-ENTMCNC: 34.2 G/DL (ref 32–36)
MCV RBC AUTO: 87 FL (ref 80–100)
MONOCYTES # BLD AUTO: 0.35 X10*3/UL (ref 0.1–1)
MONOCYTES NFR BLD AUTO: 6.5 %
NEUTROPHILS # BLD AUTO: 2.88 X10*3/UL (ref 1.2–7.7)
NEUTROPHILS NFR BLD AUTO: 53.8 %
NRBC BLD-RTO: 0 /100 WBCS (ref 0–0)
PLATELET # BLD AUTO: 249 X10*3/UL (ref 150–450)
RBC # BLD AUTO: 4.69 X10*6/UL (ref 4.5–5.9)
WBC # BLD AUTO: 5.4 X10*3/UL (ref 4.4–11.3)

## 2025-01-24 PROCEDURE — 93010 ELECTROCARDIOGRAM REPORT: CPT | Performed by: INTERNAL MEDICINE

## 2025-01-24 PROCEDURE — 85025 COMPLETE CBC W/AUTO DIFF WBC: CPT

## 2025-01-24 PROCEDURE — 93005 ELECTROCARDIOGRAM TRACING: CPT | Performed by: PHYSICIAN ASSISTANT

## 2025-01-24 PROCEDURE — 84153 ASSAY OF PSA TOTAL: CPT

## 2025-01-24 PROCEDURE — 87081 CULTURE SCREEN ONLY: CPT | Mod: AHULAB | Performed by: PHYSICIAN ASSISTANT

## 2025-01-24 PROCEDURE — 99204 OFFICE O/P NEW MOD 45 MIN: CPT | Performed by: PHYSICIAN ASSISTANT

## 2025-01-24 RX ORDER — CHLORHEXIDINE GLUCONATE ORAL RINSE 1.2 MG/ML
SOLUTION DENTAL
Qty: 475 ML | Refills: 0 | Status: SHIPPED | OUTPATIENT
Start: 2025-01-24

## 2025-01-24 ASSESSMENT — ENCOUNTER SYMPTOMS
SINUS CONGESTION: 0
BRUISES/BLEEDS EASILY: 0
CHILLS: 0
EXCESSIVE BLEEDING: 0
DOUBLE VISION: 0
COUGH: 0
VOMITING: 0
ABDOMINAL PAIN: 0
HEMOPTYSIS: 0
NECK PAIN: 0
BLOOD IN STOOL: 0
DYSPNEA AT REST: 0
ABDOMINAL DISTENTION: 0
NUMBNESS: 0
SKIN CHANGES: 0
TROUBLE SWALLOWING: 0
VISUAL CHANGE: 0
UNEXPECTED WEIGHT CHANGE: 0
EYE PAIN: 0
CONSTIPATION: 0
PALPITATIONS: 0
FEVER: 0
CONFUSION: 0
MYALGIAS: 0
DYSURIA: 0
RHINORRHEA: 0
LIGHT-HEADEDNESS: 0
DYSPNEA WITH EXERTION: 0
NAUSEA: 0
DIARRHEA: 0
EYE DISCHARGE: 0
WOUND: 0
ARTHRALGIAS: 0
LIMITED RANGE OF MOTION: 0
DIFFICULTY URINATING: 0
WHEEZING: 0
NECK STIFFNESS: 0
SHORTNESS OF BREATH: 0
WEAKNESS: 0

## 2025-01-24 NOTE — H&P (VIEW-ONLY)
[facility] Evaluation       Name: Eugene Ovalles (Eugene Ovalles)  /Age: 1970/54 y.o.       Date of Consult: 25    Referring Provider: Dr. Moreland    Surgery, Date, and Length: Robot Assisted Bilateral Inguinal Hernia Repair; Mesh Placement - Bilateral , 25, 180MIN    Eugene Ovalles is a 54 y.o. year-old male who presents to the [facility] for perioperative risk assessment prior to surgery.    Patient presents with a primary diagnosis of b/l inguinal hernias.  For approximately 1 year he has noted some pain in both the right and left groin regions.  Over the past several weeks the pain is intensified, made worse with lifting and movement (right worse than left).  He was sent for CT scan that shows bilateral, fat-containing inguinal hernias as well as a small umbilical hernia. Pt denies changes to bowel habits.  No f/c/n/v.  Pt wishes to proceed with surgery as planned.     This note was created in part upon personal review of patient's medical records.      Patient is scheduled to have Robot Assisted Bilateral Inguinal Hernia Repair; Mesh Placement - Bilateral      Pt denies any past history of anesthetic complications such as PONV, awareness, prolonged sedation, dental damage, aspiration, cardiac arrest, difficult intubation, difficult I.V. access or unexpected hospital admissions.  NO malignant hyperthermia and or pseudocholinesterase deficiency.  No history of blood transfusions     The patient is not a [employer] and will accept blood and blood products if medically indicated.   Type and screen NOT sent.     Past Medical History:   Diagnosis Date    Corns and callosities 2021    Callus of foot    Decreased libido 2020    Decreased libido    Other conditions influencing health status 2014    Myalgia and myositis    Strain of muscle, fascia and tendon of lower back, initial encounter 2020    Lumbosacral strain, initial encounter    Tinea unguium 2013     Onychomycosis of toenail       Past Surgical History:   Procedure Laterality Date    OTHER SURGICAL HISTORY  12/23/2013    Prior Surgical Procedure Not Done    OTHER SURGICAL HISTORY  12/02/2019    Fibula fracture repair       Patient  has no history on file for sexual activity.    Family History   Problem Relation Name Age of Onset    Breast cancer Mother          passed at 50 years old    Hypertension Father         Social History     Socioeconomic History    Marital status:      Spouse name: Not on file    Number of children: Not on file    Years of education: Not on file    Highest education level: Not on file   Occupational History    Not on file   Tobacco Use    Smoking status: Never    Smokeless tobacco: Never   Substance and Sexual Activity    Alcohol use: Yes     Alcohol/week: 6.0 standard drinks of alcohol     Types: 6 Standard drinks or equivalent per week    Drug use: Not Currently    Sexual activity: Not on file   Other Topics Concern    Not on file   Social History Narrative    Not on file     Social Drivers of Health     Financial Resource Strain: Not on file   Food Insecurity: Not on file   Transportation Needs: Not on file   Physical Activity: Not on file   Stress: Not on file   Social Connections: Not on file   Intimate Partner Violence: Not on file   Housing Stability: Not on file      No Known Allergies    Current Outpatient Medications   Medication Instructions    chlorhexidine (Peridex) 0.12 % solution Swish for 30 seconds and spit 15mL of solution the night before and morning of surgery    ibuprofen 800 mg, oral, 3 times daily PRN    olmesartan-hydrochlorothiazide (BENIcar HCT) 40-25 mg tablet 1 tablet, oral, Daily    polyethylene glycol-electrolytes 420 gram solution drink half by mouth beginning the night before the procedure and start drinking the 2nd half 5 hours before procedure time    rosuvastatin (CRESTOR) 40 mg, oral, Daily    sildenafil (VIAGRA) 100 mg, oral, Daily PRN     tadalafil (CIALIS) 5 mg, oral, Daily           PAT ROS:   Constitutional:    no fever   no chills   no unexpected weight change  Neuro/Psych:    no numbness   no weakness   no light-headedness   no confusion  Eyes:    no discharge   no pain   no vision loss   no diplopia   no visual disturbance  Ears:    no ear pain   no hearing loss   no tinnitus  Nose:    no nasal discharge   no sinus congestion   no epistaxis  Mouth:    no dental issues   no mouth pain   no oral bleeding   no mouth lesions  Throat:    no throat pain   no dysphagia  Neck:    no neck pain   no neck stiffness  Cardio:    Functional 4 Mets. Patient denies SOB walking up 2 flights of stairs   Works as crane ; heavy lifting  Cooking, cleaning, grocery shopping; yard work   no chest pain   no palpitations   no peripheral edema   no dyspnea   no CALDERON  Respiratory:    no cough   no wheezing   no hemoptysis   no shortness of breath  Endocrine:    no cold intolerance   no heat intolerance  GI:    no abdominal distention   no abdominal pain   no constipation   no diarrhea   no nausea   no vomiting   no blood in stool  :    no difficulty urinating   no dysuria   no oliguria  Musculoskeletal:    no arthralgias   no myalgias   no decreased ROM  Hematologic:    does not bruise/bleed easily   no excessive bleeding   no history of blood transfusion   no blood clots  Skin:   no skin changes   no sores/wound   no rash      Physical Exam  Constitutional:       General: He is not in acute distress.     Appearance: Normal appearance. He is not ill-appearing, toxic-appearing or diaphoretic.   HENT:      Head: Normocephalic and atraumatic.      Nose: Nose normal. No congestion or rhinorrhea.      Mouth/Throat:      Mouth: Mucous membranes are moist.      Pharynx: No posterior oropharyngeal erythema.   Eyes:      Extraocular Movements: Extraocular movements intact.      Conjunctiva/sclera: Conjunctivae normal.   Cardiovascular:      Rate and Rhythm: Normal rate and  "regular rhythm.      Pulses: Normal pulses.      Heart sounds: Normal heart sounds. No murmur heard.     No friction rub. No gallop.   Pulmonary:      Effort: Pulmonary effort is normal. No respiratory distress.      Breath sounds: Normal breath sounds. No stridor. No wheezing, rhonchi or rales.   Abdominal:      General: Bowel sounds are normal. There is no distension.      Palpations: Abdomen is soft. There is no mass.      Tenderness: There is abdominal tenderness (b/l groin tenderness with palpation). There is no guarding or rebound.      Hernia: A hernia (b/l inguinal hernia) is present.   Musculoskeletal:         General: No swelling, tenderness, deformity or signs of injury. Normal range of motion.      Cervical back: Normal range of motion and neck supple. No rigidity or tenderness.   Skin:     General: Skin is warm and dry.      Coloration: Skin is not jaundiced or pale.      Findings: No bruising, erythema, lesion or rash.   Neurological:      General: No focal deficit present.      Mental Status: He is alert and oriented to person, place, and time.      Cranial Nerves: No cranial nerve deficit.      Sensory: No sensory deficit.      Motor: No weakness.      Coordination: Coordination normal.   Psychiatric:         Mood and Affect: Mood normal.         Behavior: Behavior normal.          PAT AIRWAY:   Airway:     Mallampati::  I    Neck ROM::  Full   No broken teeth, no dentures and no missing teeth           Visit Vitals  /84   Pulse 80   Temp 37.6 °C (99.7 °F) (Temporal)   Resp 20   Ht 1.805 m (5' 11.06\")   Wt 118 kg (259 lb 0.7 oz)   SpO2 96%   BMI 36.06 kg/m²   Smoking Status Never   BSA 2.43 m²      LABS:  Lab Results   Component Value Date    GLUCOSE 83 01/06/2025    CALCIUM 9.5 01/06/2025     01/06/2025    K 4.0 01/06/2025    CO2 31 01/06/2025     01/06/2025    BUN 27 (H) 01/06/2025    CREATININE 1.29 01/06/2025      Lab Results   Component Value Date    WBC 5.4 01/24/2025    HGB " 14.0 01/24/2025    HCT 40.9 (L) 01/24/2025    MCV 87 01/24/2025     01/24/2025        EKG 1/24/25  NSR   Normal EKG  Vent rate = 73 bpm      Assessment and Plan:     54 y.o.  male  scheduled for Robot Assisted Bilateral Inguinal Hernia Repair; Mesh Placement - Bilateral on 1/31/25 with Dr. Moreland for  b/l inguinal hernias.   Presents to [facility] today for perioperative risk stratification and optimization      Cardiovascular:  Patient has no active cardiac symptoms.   Patient denies any chest pain, tightness, heaviness, pressure, radiating pain, palpitations, irregular heartbeats, lightheadedness, cough, congestion, shortness of breath, CALDERON, PND, near syncope, weight loss or gain.    METS: 9.9  RCRI: 0 points, 3.9%  risk for postoperative MACE     HTN - olmasartan/hydrochlorothiazide HOLD evening prior to surgery and morning of surgery     Encouraged lifestyle modifications, low-sodium diet, and increase activity as tolerated.  Monitor BP and follow up with managing physician for readings sustaining >140/90.    HLD - cont statin on dos     Pulmonary:  No pulmonary diagnosis, however patient is at increased risk of perioperative complications secondary to  obesity, duration of surgery > 2 hours  Stop Bang score is 4 placing patient at high risk for RAF  ARISCAT: <26 points, 1.6% risk of in-hospital postoperative pulmonary complication  PRODIGY: Moderate risk for opioid induced respiratory depression    **Pt provided with deep breathing exercises during PAT visit today**    Suspect RAF - RAF-Patient asked to follow up with PCP for suspected RAF. Recommend prioritizing  nonopioid analgesic techniques (regional and local anesthesia, nonsteroidal medications, etc) before the administration of opioids and close monitoring for hypoventilation after surgery due to suspected RAF. If intravenous narcotics are needed beyond the immediate shantel-operative period, the patient may benefit from continuous pulse oximetry to monitor  for hypoxic events till baseline Sp02 is normal on room air and  a respiratory therapy evaluation.    Asthma - cont inhalers as prescribed    Hematology:  Patient instructed to ambulate as soon as possible postoperatively to decrease thromboembolic risk.   Initiate mechanical DVT prophylaxis as soon as possible and initiate chemical prophylaxis when deemed safe from a bleeding standpoint post surgery.     LABS: CBC, MRSA and EKG ordered. CMP reviewed from 1/6/25 and unremarkable.     Followup: Labs and MRSA pending    Addendum 1/27/25:  Lab results unremarkable    Caprini: 4    Risk assessment complete.  Patient is scheduled for a low surgical risk procedure.        Preoperative medication instructions were provided and reviewed with the patient.  Any additional testing or evaluation was explained to the patient.  Nothing by mouth instructions were discussed and patient's questions were answered prior to conclusion to this encounter.  Patient verbalized understanding of preoperative instructions given in preadmission testing; discharge instructions available in EMR.    This note was dictated by a speech recognition.  Minor errors may have been detected in a speech recognition.

## 2025-01-24 NOTE — CPM/PAT NURSE NOTE
CPM/PAT Nurse Note      Name: Eugene Ovalles (Eugene Ovalles)  /Age: 1970/54 y.o.       Past Medical History:   Diagnosis Date    Corns and callosities 2021    Callus of foot    Decreased libido 2020    Decreased libido    Other conditions influencing health status 2014    Myalgia and myositis    Strain of muscle, fascia and tendon of lower back, initial encounter 2020    Lumbosacral strain, initial encounter    Tinea unguium 2013    Onychomycosis of toenail       Past Surgical History:   Procedure Laterality Date    OTHER SURGICAL HISTORY  2013    Prior Surgical Procedure Not Done    OTHER SURGICAL HISTORY  2019    Fibula fracture repair       Patient  has no history on file for sexual activity.    No family history on file.    No Known Allergies    Prior to Admission medications    Medication Sig Start Date End Date Taking? Authorizing Provider   ibuprofen 800 mg tablet Take 1 tablet (800 mg) by mouth 3 times a day as needed for mild pain (1 - 3) (pain). 25  Yes Chivo Alfonso, DO   olmesartan-hydrochlorothiazide (BENIcar HCT) 40-25 mg tablet Take 1 tablet by mouth once daily. 25 Yes Linnette Zimmerman MD   rosuvastatin (Crestor) 40 mg tablet Take 1 tablet (40 mg) by mouth once daily. 25 Yes Arnold Gee DO   sildenafil (Viagra) 100 mg tablet Take 1 tablet (100 mg) by mouth once daily as needed for erectile dysfunction. 25  Yes Arnold Gee DO   tadalafil (Cialis) 5 mg tablet Take 1 tablet (5 mg) by mouth once daily. 25 Yes Arnold Gee, DO   chlorhexidine (Peridex) 0.12 % solution Swish for 30 seconds and spit 15mL of solution the night before and morning of surgery 25   Lisa Pool PA-C   polyethylene glycol-electrolytes 420 gram solution drink half by mouth beginning the night before the procedure and start drinking the 2nd half 5 hours before procedure time  Patient not taking: Reported on  1/24/2025 4/25/23   Historical Provider, MD   montelukast (Singulair) 10 mg tablet Take 1 tablet (10 mg) by mouth once daily.  Patient not taking: Reported on 1/21/2025 12/23/13 1/21/25  Historical Provider, MD NEEL DRISCOLL     DASI Risk Score      Flowsheet Row Questionnaire Series Submission from 1/16/2025 in [facility] with Generic Provider Jorge Luis   Can you take care of yourself (eat, dress, bathe, or use toilet)?  2.75  filed at 01/16/2025 1748   Can you walk indoors, such as around your house? 1.75  filed at 01/16/2025 1748   Can you walk a block or two on level ground?  2.75  filed at 01/16/2025 1748   Can you climb a flight of stairs or walk up a hill? 5.5  filed at 01/16/2025 1748   Can you run a short distance? 8  filed at 01/16/2025 1748   Can you do light work around the house like dusting or washing dishes? 2.7  filed at 01/16/2025 1748   Can you do moderate work around the house like vacuuming, sweeping floors or carrying groceries? 3.5  filed at 01/16/2025 1748   Can you do heavy work around the house like scrubbing floors or lifting and moving heavy furniture?  8  filed at 01/16/2025 1748   Can you do yard work like raking leaves, weeding or pushing a mower? 4.5  filed at 01/16/2025 1748   Can you have sexual relations? 5.25  filed at 01/16/2025 1748   Can you participate in moderate recreational activities like golf, bowling, dancing, doubles tennis or throwing a baseball or football? 6  filed at 01/16/2025 1748   Can you participate in strenous sports like swimming, singles tennis, football, basketball, or skiing? 7.5  filed at 01/16/2025 1748   DASI SCORE 58.2  filed at 01/16/2025 1748   METS Score (Will be calculated only when all the questions are answered) 9.9  filed at 01/16/2025 0337          Caprini DVT Assessment    No data to display       Modified Frailty Index    No data to display       CHADS2 Stroke Risk  Current as of 3 days ago        N/A 3 to 100%: High Risk   2 to < 3%: Medium  Risk   0 to < 2%: Low Risk     Last Change: N/A          This score determines the patient's risk of having a stroke if the patient has atrial fibrillation.        This score is not applicable to this patient. Components are not calculated.          Revised Cardiac Risk Index    No data to display       Apfel Simplified Score    No data to display       Risk Analysis Index Results This Encounter    No data found in the last 10 encounters.       Stop Bang Score      Flowsheet Row Questionnaire Series Submission from 1/16/2025 in [facility] with Generic Provider Jorge Luis   Do you snore loudly? 0  filed at 01/16/2025 1748   Do you often feel tired or fatigued after your sleep? 0  filed at 01/16/2025 1748   Has anyone ever observed you stop breathing in your sleep? 0  filed at 01/16/2025 1748   Do you have or are you being treated for high blood pressure? 1  filed at 01/16/2025 1748   Recent BMI (Calculated) 35.7  filed at 01/16/2025 1748   Is BMI greater than 35 kg/m2? 1=Yes  filed at 01/16/2025 1748   Age older than 50 years old? 1=Yes  filed at 01/16/2025 1748   Gender - Male 1=Yes  filed at 01/16/2025 1748          Prodigy: High Risk  Total Score: 8              Prodigy Gender Score          ARISCAT Score for Postoperative Pulmonary Complications      Flowsheet Row Pre-Admission Testing from 1/24/2025 in [facility] with Lisa Pool PA-C   Age Calculated Score 3 filed at 01/24/2025 1445   Preoperative SpO2 0 filed at 01/24/2025 1445   Respiratory infection in the last month Either upper or lower (i.e., URI, bronchitis, pneumonia), with fever and antibiotic treatment 0 filed at 01/24/2025 1445   Preoperative anemia (Hgb less than 10 g/dl) 0 filed at 01/24/2025 1445   Surgical incision  0 filed at 01/24/2025 1445   Duration of surgery  16 filed at 01/24/2025 1445   Emergency Procedure  0 filed at 01/24/2025 1445   ARISCAT Total Score  19 filed at 01/24/2025 1445          Estephanie Perioperative Risk  for Myocardial Infarction or Cardiac Arrest (EMERY)    No data to display         Nurse Plan of Action:     After Visit Summary (AVS) reviewed and patient verbalized good understanding of medications and NPO instructions.  Pre-op infection prevention measures:  CHG showers and mouthwash reviewed, understanding voiced.  CHG soap given and patient verbalized need to pick CHG mouthwash at their preferred local pharmacy.

## 2025-01-24 NOTE — PREPROCEDURE INSTRUCTIONS
Medication List            Accurate as of January 24, 2025  2:48 PM. Always use your most recent med list.                chlorhexidine 0.12 % solution  Commonly known as: Peridex  Swish for 30 seconds and spit 15mL of solution the night before and morning of surgery  Medication Adjustments for Surgery: Take/Use as prescribed     ibuprofen 800 mg tablet  Take 1 tablet (800 mg) by mouth 3 times a day as needed for mild pain (1 - 3) (pain).  Additional Medication Adjustments for Surgery: Take last dose 7 days before surgery     olmesartan-hydrochlorothiazide 40-25 mg tablet  Commonly known as: BENIcar HCT  Take 1 tablet by mouth once daily.  Notes to patient: HOLD evening prior to surgery and morning of surgery        polyethylene glycol-electrolytes 420 gram solution  Commonly known as: Nulytely  Medication Adjustments for Surgery: Do Not take on the morning of surgery     rosuvastatin 40 mg tablet  Commonly known as: Crestor  Take 1 tablet (40 mg) by mouth once daily.  Medication Adjustments for Surgery: Take on the morning of surgery     sildenafil 100 mg tablet  Commonly known as: Viagra  Take 1 tablet (100 mg) by mouth once daily as needed for erectile dysfunction.  Medication Adjustments for Surgery: Take last dose 3 days before surgery  Notes to patient: Last dose on or before 1/27/25     tadalafil 5 mg tablet  Commonly known as: Cialis  Take 1 tablet (5 mg) by mouth once daily.  Medication Adjustments for Surgery: Take last dose 3 days before surgery  Notes to patient: Last dose on or before 1/27/25                          **Concerning above medication instructions, if medication is normally taken at night, continue normal schedule.**  **DO NOT TAKE NIGHT PRIOR AND MORNING OF SURGERY**    CONTACT SURGEON'S OFFICE IF YOU DEVELOP:  * Fever = 100.4 F   * New respiratory symptoms (e.g. cough, shortness of breath, respiratory distress, sore throat)  * Recent loss of taste or smell  *Flu like symptoms such as  headache, fatigue or gastrointestinal symptoms  * You develop any open sores, shingles, burning or painful urination   AND/OR:  * You no longer wish to have the surgery.  * Any other personal circumstances change that may lead to the need to cancel or defer this surgery.  *You were admitted to any hospital within one week of your planned procedure.    SMOKING:  *Quitting smoking can make a huge difference to your health and recovery from surgery.    *If you need help with quitting, call 7-438-QUIT-NOW.    THE DAY OF SURGERY:  *Do not eat any food after midnight the night before surgery.   *You must drink 13.5 ounces of clear liquids (i.e. water, black coffee (no milk or cream), tea, apple juice or electrolyte drinks (gatorade)) 2 hours before your arrival time.  *You may chew gum until 2 hours before your surgery    SURGICAL TIME  *You will be contacted between 2 p.m. and 6 p.m. the business day before your surgery with your arrival time.  *If you haven't received a call by 6pm, call [phone].  *Scheduled surgery times may change and you will be notified if this occurs-check your personal voicemail for any updates.    ON THE MORNING OF SURGERY:  *Wear comfortable, loose fitting clothing.   *Do not use moisturizers, creams, lotions or perfume.  *All jewelry and valuables should be left at home.  *Prosthetic devices such as contact lenses, hearing aids, dentures, eyelash extensions, hairpins and body piercing must be removed before surgery.    BRING WITH YOU:  *Photo ID and insurance card  *Current list of medicines and allergies  *Pacemaker/Defibrillator/Heart stent cards  *CPAP machine and mask  *Slings/splints/crutches  *Copy of your complete Advanced Directive/DHPOA-if applicable  *Neurostimulator implant remote    PARKING AND ARRIVAL:  *Check in at the Main Entrance desk and let them know you are here for surgery.  *You will be directed to the 2nd floor surgical waiting area.    AFTER OUTPATIENT SURGERY:  *A  responsible adult MUST accompany you at the time of discharge and stay with you for 24 hours after your surgery.  *You may NOT drive yourself home after surgery.  *You may use a taxi or ride sharing service ([employer], [employer]) to return home ONLY if you are accompanied by a friend or family member.  *Instructions for resuming your medications will be provided by your surgeon.           Patient Information: Oral/Dental Rinse  **This is a prescription; pick it up at your preferred local pharmacy **  What is oral/dental rinse?   It is a mouthwash. It is a way of cleaning the mouth with a germ killing solution before your surgery.  The solution contains chlorhexidine, commonly known as CHG.   It is used inside the mouth to kill a bacteria known as Staphylococcus aureus.  Let your doctor know if you are allergic to Chlorhexidine.    Why do I need to use CHG oral/dental rinse?  The CHG oral/dental rinse helps to kill a bacteria in your mouth known a Staphylococcus aureus.     This reduces the risk of infection at the surgical site.      Using your CHG oral/dental rinse  STEPS:  Use your CHG oral/dental rinse after you brush your teeth the night before (at bedtime) and the morning of your surgery.  Follow all directions on your prescription label.    Use the cap on the container to measure 15ml (fill cap to fill line)  Swish (gargle if you can) the mouthwash in your mouth for at least 30 seconds, (do not to swallow) spit out  After you use your CHG rinse, do not rinse your mouth with water, drink or eat.  Please refer to prescription label for the appropriate time to resume oral intake  Dental rinse comes in one size bottle: 473ml ~16oz.  You will have leftover    rinse, discard after this use.    What side effects might I have using the CHG oral/dental rinse?  CHG rinse will stick to plaque on the teeth.  Brush and floss just before use.  Teeth brushing will help avoid staining of plaque during use.    Who should I contact if I  have questions about the CHG oral/dental rinse?  Please call [facility], Preadmission Testing at [phone] if you have any questions      Home Preoperative Antibacterial Shower     What is a home preoperative antibacterial shower?  This shower is a way of cleaning the skin with a germ killing soap before surgery.  The soap contains chlorhexidine, commonly known as CHG.  CHG is a soap for your skin with germ killing ability.  Let your doctor know if you are allergic to chlorhexidine.    Why do I need to take a preoperative antibacterial shower?  Skin is not sterile.  It is best to try to make your skin as free of germs as possible before surgery.  Proper cleansing with a germ killing soap before surgery can lower the number of germs on your skin.  This helps to reduce the risk of infection at the surgical site.  Following the instructions listed below will help you prepare your skin for surgery.      How do I use the CHG skin cleanser?  Steps:  Begin using your CHG soap five days before your scheduled surgery on ________________________.    Days 1-4 Shower before bed:  Wash your face and genitals with your normal soap and rinse.    Wash and rinse your hair using the CHG soap. Rinse completely, do not condition your   Hair.          3.    Apply the CHG soap to a clean wet washcloth.  Turn the water off or move away                From the water spray to avoid premature rinsing of the CHG soap as you are applying.     4.   Lather your entire body from the neck down.  Do not use on your face or genitals.   Pay special attention to the area(s) where your incision(s) will be located unless they are on your face.  Avoid scrubbing your skin too hard.  The important point is to have the CHG soap sit on your skin for 3 minutes.    When the 3 minutes are up, turn on the water and rinse the CHG soap off your body completely.   Pat yourself dry with a clean, freshly-laundered towel.  Dress in  clean, freshly laundered night clothes.    Be sure to sleep with clean, freshly laundered sheets.  Day 5:  Last shower is the morning before surgery: Follow above Instructions.    NOTE:    *Hair extensions should be removed    *Keep CHG soap out of eyes and ear canals   *DO NOT wash with regular soap on your body after you have used the CHG        soap solution  *DO NOT apply powders, lotions, or perfume.  *Deodorant may be used days 1-4, BUT NOT the day of surgery    Who should I contact if I have any questions regarding the use of CHG soap?  Call the [facility], Preadmission Testing at [phone] if you have any questions.              Patient Information: Pre-Operative Infection Prevention Measures     Why did I have my nose, under my arms and groin swabbed?  The purpose of the swab is to identify Staphylococcus aureus inside your nose or on your skin.  The swab was sent to the laboratory for culture.  A positive swab/culture for Staphylococcus aureus is called colonization or carriage.      What is Staphylococcus aureus?  Staphylococcus aureus, also known as “staph”, is a germ found on the skin or in the nose of healthy people.  Sometimes Staphylococcus aureus can get into the body and cause an infection.  This can be minor (such as pimples, boils or other skin problems).  It might also be serious (such as blood infection, pneumonia or a surgical site infection).    What is Staphylococcus aureus colonization or carriage?  Colonization or carriage means that a person has the germ but is not sick from it.  These bacteria can be spread on the hands or when breathing or sneezing.    How is Staphylococcus aureus spread?  It is most often spread by close contact with a person or item that carries it.    What happens if my culture is positive for Staphylococcus aureus?  Your doctor/medical team will use this information to guide any antibiotic treatment which may be necessary.   Regardless of the culture results, we will clean the inside of your nose with a betadine swab just before you have your surgery.      Will I get an infection if I have Staphylococcus aureus in my nose or on my skin?  Anyone can get an infection with Staphylococcus aureus.  However, the best way to reduce your risk of infection is to follow the instructions provided to you for the use of your CHG soap and dental rinse.        Who should I contact if I have any questions?  Call the [facility], Preadmission Testing at [phone] if you have any questions.          Preoperative Deep Breathing Exercises  Why it is important to do deep breathing exercises before my surgery?  Deep breathing exercises strengthen your breathing muscles.  This helps you to recover after your surgery and decreases the chance of breathing complications.  How are the deep breathing exercises done?  Sit straight with your back supported.  Breathe in deeply and slowly through your nose. Your lower rib cage should expand and your abdomen may move forward.  Hold that breath for 3 to 5 seconds.  Breathe out through pursed lips, slowly and completely.  Rest and repeat 10 times every hour while awake.  Rest longer if you become dizzy or lightheaded.

## 2025-01-24 NOTE — CPM/PAT H&P
[facility] Evaluation       Name: Eugene Ovalles (Eugene Ovalles)  /Age: 1970/54 y.o.       Date of Consult: 25    Referring Provider: Dr. Moreland    Surgery, Date, and Length: Robot Assisted Bilateral Inguinal Hernia Repair; Mesh Placement - Bilateral , 25, 180MIN    Eugene Ovalles is a 54 y.o. year-old male who presents to the [facility] for perioperative risk assessment prior to surgery.    Patient presents with a primary diagnosis of b/l inguinal hernias.  For approximately 1 year he has noted some pain in both the right and left groin regions.  Over the past several weeks the pain is intensified, made worse with lifting and movement (right worse than left).  He was sent for CT scan that shows bilateral, fat-containing inguinal hernias as well as a small umbilical hernia. Pt denies changes to bowel habits.  No f/c/n/v.  Pt wishes to proceed with surgery as planned.     This note was created in part upon personal review of patient's medical records.      Patient is scheduled to have Robot Assisted Bilateral Inguinal Hernia Repair; Mesh Placement - Bilateral      Pt denies any past history of anesthetic complications such as PONV, awareness, prolonged sedation, dental damage, aspiration, cardiac arrest, difficult intubation, difficult I.V. access or unexpected hospital admissions.  NO malignant hyperthermia and or pseudocholinesterase deficiency.  No history of blood transfusions     The patient is not a [employer] and will accept blood and blood products if medically indicated.   Type and screen NOT sent.     Past Medical History:   Diagnosis Date    Corns and callosities 2021    Callus of foot    Decreased libido 2020    Decreased libido    Other conditions influencing health status 2014    Myalgia and myositis    Strain of muscle, fascia and tendon of lower back, initial encounter 2020    Lumbosacral strain, initial encounter    Tinea unguium 2013     Onychomycosis of toenail       Past Surgical History:   Procedure Laterality Date    OTHER SURGICAL HISTORY  12/23/2013    Prior Surgical Procedure Not Done    OTHER SURGICAL HISTORY  12/02/2019    Fibula fracture repair       Patient  has no history on file for sexual activity.    Family History   Problem Relation Name Age of Onset    Breast cancer Mother          passed at 50 years old    Hypertension Father         Social History     Socioeconomic History    Marital status:      Spouse name: Not on file    Number of children: Not on file    Years of education: Not on file    Highest education level: Not on file   Occupational History    Not on file   Tobacco Use    Smoking status: Never    Smokeless tobacco: Never   Substance and Sexual Activity    Alcohol use: Yes     Alcohol/week: 6.0 standard drinks of alcohol     Types: 6 Standard drinks or equivalent per week    Drug use: Not Currently    Sexual activity: Not on file   Other Topics Concern    Not on file   Social History Narrative    Not on file     Social Drivers of Health     Financial Resource Strain: Not on file   Food Insecurity: Not on file   Transportation Needs: Not on file   Physical Activity: Not on file   Stress: Not on file   Social Connections: Not on file   Intimate Partner Violence: Not on file   Housing Stability: Not on file      No Known Allergies    Current Outpatient Medications   Medication Instructions    chlorhexidine (Peridex) 0.12 % solution Swish for 30 seconds and spit 15mL of solution the night before and morning of surgery    ibuprofen 800 mg, oral, 3 times daily PRN    olmesartan-hydrochlorothiazide (BENIcar HCT) 40-25 mg tablet 1 tablet, oral, Daily    polyethylene glycol-electrolytes 420 gram solution drink half by mouth beginning the night before the procedure and start drinking the 2nd half 5 hours before procedure time    rosuvastatin (CRESTOR) 40 mg, oral, Daily    sildenafil (VIAGRA) 100 mg, oral, Daily PRN     tadalafil (CIALIS) 5 mg, oral, Daily           PAT ROS:   Constitutional:    no fever   no chills   no unexpected weight change  Neuro/Psych:    no numbness   no weakness   no light-headedness   no confusion  Eyes:    no discharge   no pain   no vision loss   no diplopia   no visual disturbance  Ears:    no ear pain   no hearing loss   no tinnitus  Nose:    no nasal discharge   no sinus congestion   no epistaxis  Mouth:    no dental issues   no mouth pain   no oral bleeding   no mouth lesions  Throat:    no throat pain   no dysphagia  Neck:    no neck pain   no neck stiffness  Cardio:    Functional 4 Mets. Patient denies SOB walking up 2 flights of stairs   Works as crane ; heavy lifting  Cooking, cleaning, grocery shopping; yard work   no chest pain   no palpitations   no peripheral edema   no dyspnea   no CALDERON  Respiratory:    no cough   no wheezing   no hemoptysis   no shortness of breath  Endocrine:    no cold intolerance   no heat intolerance  GI:    no abdominal distention   no abdominal pain   no constipation   no diarrhea   no nausea   no vomiting   no blood in stool  :    no difficulty urinating   no dysuria   no oliguria  Musculoskeletal:    no arthralgias   no myalgias   no decreased ROM  Hematologic:    does not bruise/bleed easily   no excessive bleeding   no history of blood transfusion   no blood clots  Skin:   no skin changes   no sores/wound   no rash      Physical Exam  Constitutional:       General: He is not in acute distress.     Appearance: Normal appearance. He is not ill-appearing, toxic-appearing or diaphoretic.   HENT:      Head: Normocephalic and atraumatic.      Nose: Nose normal. No congestion or rhinorrhea.      Mouth/Throat:      Mouth: Mucous membranes are moist.      Pharynx: No posterior oropharyngeal erythema.   Eyes:      Extraocular Movements: Extraocular movements intact.      Conjunctiva/sclera: Conjunctivae normal.   Cardiovascular:      Rate and Rhythm: Normal rate and  "regular rhythm.      Pulses: Normal pulses.      Heart sounds: Normal heart sounds. No murmur heard.     No friction rub. No gallop.   Pulmonary:      Effort: Pulmonary effort is normal. No respiratory distress.      Breath sounds: Normal breath sounds. No stridor. No wheezing, rhonchi or rales.   Abdominal:      General: Bowel sounds are normal. There is no distension.      Palpations: Abdomen is soft. There is no mass.      Tenderness: There is abdominal tenderness (b/l groin tenderness with palpation). There is no guarding or rebound.      Hernia: A hernia (b/l inguinal hernia) is present.   Musculoskeletal:         General: No swelling, tenderness, deformity or signs of injury. Normal range of motion.      Cervical back: Normal range of motion and neck supple. No rigidity or tenderness.   Skin:     General: Skin is warm and dry.      Coloration: Skin is not jaundiced or pale.      Findings: No bruising, erythema, lesion or rash.   Neurological:      General: No focal deficit present.      Mental Status: He is alert and oriented to person, place, and time.      Cranial Nerves: No cranial nerve deficit.      Sensory: No sensory deficit.      Motor: No weakness.      Coordination: Coordination normal.   Psychiatric:         Mood and Affect: Mood normal.         Behavior: Behavior normal.          PAT AIRWAY:   Airway:     Mallampati::  I    Neck ROM::  Full   No broken teeth, no dentures and no missing teeth           Visit Vitals  /84   Pulse 80   Temp 37.6 °C (99.7 °F) (Temporal)   Resp 20   Ht 1.805 m (5' 11.06\")   Wt 118 kg (259 lb 0.7 oz)   SpO2 96%   BMI 36.06 kg/m²   Smoking Status Never   BSA 2.43 m²      LABS:  Lab Results   Component Value Date    GLUCOSE 83 01/06/2025    CALCIUM 9.5 01/06/2025     01/06/2025    K 4.0 01/06/2025    CO2 31 01/06/2025     01/06/2025    BUN 27 (H) 01/06/2025    CREATININE 1.29 01/06/2025        EKG 1/24/25  NSR   Normal EKG  Vent rate = 73 " bpm      Assessment and Plan:     54 y.o.  male  scheduled for Robot Assisted Bilateral Inguinal Hernia Repair; Mesh Placement - Bilateral on 1/31/25 with Dr. Moreland for  b/l inguinal hernias.   Presents to CPM today for perioperative risk stratification and optimization      Cardiovascular:  Patient has no active cardiac symptoms.   Patient denies any chest pain, tightness, heaviness, pressure, radiating pain, palpitations, irregular heartbeats, lightheadedness, cough, congestion, shortness of breath, CALDERON, PND, near syncope, weight loss or gain.    METS: 9.9  RCRI: 0 points, 3.9%  risk for postoperative MACE     HTN - olmasartan/hydrochlorothiazide HOLD evening prior to surgery and morning of surgery     Encouraged lifestyle modifications, low-sodium diet, and increase activity as tolerated.  Monitor BP and follow up with managing physician for readings sustaining >140/90.    HLD - cont statin on dos     Pulmonary:  No pulmonary diagnosis, however patient is at increased risk of perioperative complications secondary to  obesity, duration of surgery > 2 hours  Stop Bang score is 4 placing patient at high risk for RAF  ARISCAT: <26 points, 1.6% risk of in-hospital postoperative pulmonary complication  PRODIGY: Moderate risk for opioid induced respiratory depression    **Pt provided with deep breathing exercises during PAT visit today**    Suspect RAF - RAF-Patient asked to follow up with PCP for suspected ARF. Recommend prioritizing  nonopioid analgesic techniques (regional and local anesthesia, nonsteroidal medications, etc) before the administration of opioids and close monitoring for hypoventilation after surgery due to suspected RAF. If intravenous narcotics are needed beyond the immediate shantel-operative period, the patient may benefit from continuous pulse oximetry to monitor for hypoxic events till baseline Sp02 is normal on room air and  a respiratory therapy evaluation.    Asthma - cont inhalers as  prescribed    Hematology:  Patient instructed to ambulate as soon as possible postoperatively to decrease thromboembolic risk.   Initiate mechanical DVT prophylaxis as soon as possible and initiate chemical prophylaxis when deemed safe from a bleeding standpoint post surgery.     LABS: CBC, MRSA and EKG ordered. CMP reviewed from 1/6/25 and unremarkable.     Followup: Labs and MRSA pending    Caprini: 4    Risk assessment complete.  Patient is scheduled for a low surgical risk procedure.        Preoperative medication instructions were provided and reviewed with the patient.  Any additional testing or evaluation was explained to the patient.  Nothing by mouth instructions were discussed and patient's questions were answered prior to conclusion to this encounter.  Patient verbalized understanding of preoperative instructions given in preadmission testing; discharge instructions available in EMR.    This note was dictated by a speech recognition.  Minor errors may have been detected in a speech recognition.

## 2025-01-25 LAB — PSA SERPL-MCNC: 0.93 NG/ML

## 2025-01-26 LAB — STAPHYLOCOCCUS SPEC CULT: NORMAL

## 2025-01-27 LAB
ATRIAL RATE: 73 BPM
P AXIS: 14 DEGREES
P OFFSET: 184 MS
P ONSET: 135 MS
PR INTERVAL: 160 MS
Q ONSET: 215 MS
QRS COUNT: 12 BEATS
QRS DURATION: 86 MS
QT INTERVAL: 378 MS
QTC CALCULATION(BAZETT): 416 MS
QTC FREDERICIA: 403 MS
R AXIS: 38 DEGREES
T AXIS: 28 DEGREES
T OFFSET: 404 MS
VENTRICULAR RATE: 73 BPM

## 2025-01-31 ENCOUNTER — ANESTHESIA EVENT (OUTPATIENT)
Dept: OPERATING ROOM | Facility: HOSPITAL | Age: 55
End: 2025-01-31
Payer: COMMERCIAL

## 2025-01-31 ENCOUNTER — ANESTHESIA (OUTPATIENT)
Dept: OPERATING ROOM | Facility: HOSPITAL | Age: 55
End: 2025-01-31
Payer: COMMERCIAL

## 2025-01-31 ENCOUNTER — HOSPITAL ENCOUNTER (OUTPATIENT)
Facility: HOSPITAL | Age: 55
Setting detail: OUTPATIENT SURGERY
Discharge: HOME | End: 2025-01-31
Attending: SURGERY | Admitting: SURGERY
Payer: COMMERCIAL

## 2025-01-31 VITALS
HEART RATE: 73 BPM | DIASTOLIC BLOOD PRESSURE: 60 MMHG | WEIGHT: 257.72 LBS | TEMPERATURE: 97.2 F | BODY MASS INDEX: 36.08 KG/M2 | RESPIRATION RATE: 12 BRPM | SYSTOLIC BLOOD PRESSURE: 111 MMHG | OXYGEN SATURATION: 97 % | HEIGHT: 71 IN

## 2025-01-31 DIAGNOSIS — K40.20 NON-RECURRENT BILATERAL INGUINAL HERNIA WITHOUT OBSTRUCTION OR GANGRENE: Primary | ICD-10-CM

## 2025-01-31 PROCEDURE — 2780000003 HC OR 278 NO HCPCS: Performed by: SURGERY

## 2025-01-31 PROCEDURE — 2500000001 HC RX 250 WO HCPCS SELF ADMINISTERED DRUGS (ALT 637 FOR MEDICARE OP): Performed by: STUDENT IN AN ORGANIZED HEALTH CARE EDUCATION/TRAINING PROGRAM

## 2025-01-31 PROCEDURE — 49650 LAP ING HERNIA REPAIR INIT: CPT | Performed by: SURGERY

## 2025-01-31 PROCEDURE — 3600000017 HC OR TIME - EACH INCREMENTAL 1 MINUTE - PROCEDURE LEVEL SIX: Performed by: SURGERY

## 2025-01-31 PROCEDURE — 2500000005 HC RX 250 GENERAL PHARMACY W/O HCPCS: Performed by: SURGERY

## 2025-01-31 PROCEDURE — 7100000001 HC RECOVERY ROOM TIME - INITIAL BASE CHARGE: Performed by: SURGERY

## 2025-01-31 PROCEDURE — 2500000004 HC RX 250 GENERAL PHARMACY W/ HCPCS (ALT 636 FOR OP/ED): Mod: JZ | Performed by: STUDENT IN AN ORGANIZED HEALTH CARE EDUCATION/TRAINING PROGRAM

## 2025-01-31 PROCEDURE — C1781 MESH (IMPLANTABLE): HCPCS | Performed by: SURGERY

## 2025-01-31 PROCEDURE — 3700000002 HC GENERAL ANESTHESIA TIME - EACH INCREMENTAL 1 MINUTE: Performed by: SURGERY

## 2025-01-31 PROCEDURE — 2720000007 HC OR 272 NO HCPCS: Performed by: SURGERY

## 2025-01-31 PROCEDURE — 2500000004 HC RX 250 GENERAL PHARMACY W/ HCPCS (ALT 636 FOR OP/ED): Mod: JZ | Performed by: ANESTHESIOLOGIST ASSISTANT

## 2025-01-31 PROCEDURE — 3700000001 HC GENERAL ANESTHESIA TIME - INITIAL BASE CHARGE: Performed by: SURGERY

## 2025-01-31 PROCEDURE — 7100000009 HC PHASE TWO TIME - INITIAL BASE CHARGE: Performed by: SURGERY

## 2025-01-31 PROCEDURE — 3600000018 HC OR TIME - INITIAL BASE CHARGE - PROCEDURE LEVEL SIX: Performed by: SURGERY

## 2025-01-31 PROCEDURE — 7100000010 HC PHASE TWO TIME - EACH INCREMENTAL 1 MINUTE: Performed by: SURGERY

## 2025-01-31 PROCEDURE — 7100000002 HC RECOVERY ROOM TIME - EACH INCREMENTAL 1 MINUTE: Performed by: SURGERY

## 2025-01-31 PROCEDURE — 2500000005 HC RX 250 GENERAL PHARMACY W/O HCPCS: Performed by: ANESTHESIOLOGIST ASSISTANT

## 2025-01-31 PROCEDURE — 2500000005 HC RX 250 GENERAL PHARMACY W/O HCPCS: Performed by: STUDENT IN AN ORGANIZED HEALTH CARE EDUCATION/TRAINING PROGRAM

## 2025-01-31 PROCEDURE — 2500000004 HC RX 250 GENERAL PHARMACY W/ HCPCS (ALT 636 FOR OP/ED): Performed by: SURGERY

## 2025-01-31 DEVICE — 3DMAX MID ANATOMICAL MESH, 12 CM X 17 CM (5" X 7"), EXTRA LARGE, LEFT
Type: IMPLANTABLE DEVICE | Site: INGUINAL | Status: FUNCTIONAL
Brand: 3DMAX

## 2025-01-31 DEVICE — 3DMAX MID ANATOMICAL MESH, 12 CM X 17 CM (5" X 7"), EXTRA LARGE, RIGHT
Type: IMPLANTABLE DEVICE | Site: INGUINAL | Status: FUNCTIONAL
Brand: 3DMAX

## 2025-01-31 RX ORDER — ONDANSETRON HYDROCHLORIDE 2 MG/ML
INJECTION, SOLUTION INTRAVENOUS AS NEEDED
Status: DISCONTINUED | OUTPATIENT
Start: 2025-01-31 | End: 2025-01-31

## 2025-01-31 RX ORDER — ONDANSETRON HYDROCHLORIDE 2 MG/ML
4 INJECTION, SOLUTION INTRAVENOUS ONCE AS NEEDED
Status: DISCONTINUED | OUTPATIENT
Start: 2025-01-31 | End: 2025-01-31 | Stop reason: HOSPADM

## 2025-01-31 RX ORDER — CEFAZOLIN 1 G/1
INJECTION, POWDER, FOR SOLUTION INTRAVENOUS AS NEEDED
Status: DISCONTINUED | OUTPATIENT
Start: 2025-01-31 | End: 2025-01-31

## 2025-01-31 RX ORDER — HYDROMORPHONE HYDROCHLORIDE 1 MG/ML
INJECTION, SOLUTION INTRAMUSCULAR; INTRAVENOUS; SUBCUTANEOUS AS NEEDED
Status: DISCONTINUED | OUTPATIENT
Start: 2025-01-31 | End: 2025-01-31

## 2025-01-31 RX ORDER — MIDAZOLAM HYDROCHLORIDE 1 MG/ML
INJECTION INTRAMUSCULAR; INTRAVENOUS AS NEEDED
Status: DISCONTINUED | OUTPATIENT
Start: 2025-01-31 | End: 2025-01-31

## 2025-01-31 RX ORDER — SODIUM CHLORIDE, SODIUM LACTATE, POTASSIUM CHLORIDE, CALCIUM CHLORIDE 600; 310; 30; 20 MG/100ML; MG/100ML; MG/100ML; MG/100ML
100 INJECTION, SOLUTION INTRAVENOUS CONTINUOUS
Status: DISCONTINUED | OUTPATIENT
Start: 2025-01-31 | End: 2025-01-31 | Stop reason: HOSPADM

## 2025-01-31 RX ORDER — DIPHENHYDRAMINE HYDROCHLORIDE 50 MG/ML
12.5 INJECTION INTRAMUSCULAR; INTRAVENOUS ONCE AS NEEDED
Status: DISCONTINUED | OUTPATIENT
Start: 2025-01-31 | End: 2025-01-31 | Stop reason: HOSPADM

## 2025-01-31 RX ORDER — LIDOCAINE HYDROCHLORIDE 10 MG/ML
0.1 INJECTION, SOLUTION EPIDURAL; INFILTRATION; INTRACAUDAL; PERINEURAL ONCE
Status: DISCONTINUED | OUTPATIENT
Start: 2025-01-31 | End: 2025-01-31 | Stop reason: HOSPADM

## 2025-01-31 RX ORDER — PROPOFOL 10 MG/ML
INJECTION, EMULSION INTRAVENOUS AS NEEDED
Status: DISCONTINUED | OUTPATIENT
Start: 2025-01-31 | End: 2025-01-31

## 2025-01-31 RX ORDER — LIDOCAINE HYDROCHLORIDE 20 MG/ML
INJECTION, SOLUTION INFILTRATION; PERINEURAL AS NEEDED
Status: DISCONTINUED | OUTPATIENT
Start: 2025-01-31 | End: 2025-01-31

## 2025-01-31 RX ORDER — PHENYLEPHRINE HCL IN 0.9% NACL 1 MG/10 ML
SYRINGE (ML) INTRAVENOUS AS NEEDED
Status: DISCONTINUED | OUTPATIENT
Start: 2025-01-31 | End: 2025-01-31

## 2025-01-31 RX ORDER — WATER 1 ML/ML
IRRIGANT IRRIGATION AS NEEDED
Status: DISCONTINUED | OUTPATIENT
Start: 2025-01-31 | End: 2025-01-31 | Stop reason: HOSPADM

## 2025-01-31 RX ORDER — FENTANYL CITRATE 50 UG/ML
INJECTION, SOLUTION INTRAMUSCULAR; INTRAVENOUS AS NEEDED
Status: DISCONTINUED | OUTPATIENT
Start: 2025-01-31 | End: 2025-01-31

## 2025-01-31 RX ORDER — OXYCODONE HYDROCHLORIDE 5 MG/1
5 TABLET ORAL EVERY 6 HOURS PRN
Qty: 12 TABLET | Refills: 0 | Status: SHIPPED | OUTPATIENT
Start: 2025-01-31

## 2025-01-31 RX ORDER — ROCURONIUM BROMIDE 10 MG/ML
INJECTION, SOLUTION INTRAVENOUS AS NEEDED
Status: DISCONTINUED | OUTPATIENT
Start: 2025-01-31 | End: 2025-01-31

## 2025-01-31 RX ORDER — OXYCODONE HYDROCHLORIDE 5 MG/1
5 TABLET ORAL EVERY 4 HOURS PRN
Status: DISCONTINUED | OUTPATIENT
Start: 2025-01-31 | End: 2025-01-31 | Stop reason: HOSPADM

## 2025-01-31 RX ORDER — LABETALOL HYDROCHLORIDE 5 MG/ML
5 INJECTION, SOLUTION INTRAVENOUS ONCE AS NEEDED
Status: DISCONTINUED | OUTPATIENT
Start: 2025-01-31 | End: 2025-01-31 | Stop reason: HOSPADM

## 2025-01-31 RX ADMIN — LIDOCAINE HYDROCHLORIDE 100 MG: 20 INJECTION, SOLUTION INFILTRATION; PERINEURAL at 07:43

## 2025-01-31 RX ADMIN — ROCURONIUM BROMIDE 20 MG: 10 INJECTION, SOLUTION INTRAVENOUS at 08:49

## 2025-01-31 RX ADMIN — HYDROMORPHONE HYDROCHLORIDE 1 MG: 1 INJECTION, SOLUTION INTRAMUSCULAR; INTRAVENOUS; SUBCUTANEOUS at 09:56

## 2025-01-31 RX ADMIN — FENTANYL CITRATE 50 MCG: 50 INJECTION, SOLUTION INTRAMUSCULAR; INTRAVENOUS at 07:43

## 2025-01-31 RX ADMIN — ROCURONIUM BROMIDE 20 MG: 10 INJECTION, SOLUTION INTRAVENOUS at 08:27

## 2025-01-31 RX ADMIN — DEXAMETHASONE SODIUM PHOSPHATE 8 MG: 4 INJECTION, SOLUTION INTRAMUSCULAR; INTRAVENOUS at 07:49

## 2025-01-31 RX ADMIN — ONDANSETRON 4 MG: 2 INJECTION, SOLUTION INTRAMUSCULAR; INTRAVENOUS at 09:56

## 2025-01-31 RX ADMIN — SUGAMMADEX 200 MG: 100 INJECTION, SOLUTION INTRAVENOUS at 10:06

## 2025-01-31 RX ADMIN — Medication 200 MCG: at 09:01

## 2025-01-31 RX ADMIN — HYDROMORPHONE HYDROCHLORIDE 0.5 MG: 1 INJECTION, SOLUTION INTRAMUSCULAR; INTRAVENOUS; SUBCUTANEOUS at 10:31

## 2025-01-31 RX ADMIN — OXYCODONE HYDROCHLORIDE 5 MG: 5 TABLET ORAL at 11:03

## 2025-01-31 RX ADMIN — MIDAZOLAM HYDROCHLORIDE 2 MG: 1 INJECTION, SOLUTION INTRAMUSCULAR; INTRAVENOUS at 07:28

## 2025-01-31 RX ADMIN — Medication 2 L/MIN: at 10:45

## 2025-01-31 RX ADMIN — CEFAZOLIN 3 G: 1 INJECTION, POWDER, FOR SOLUTION INTRAMUSCULAR; INTRAVENOUS at 07:47

## 2025-01-31 RX ADMIN — PROPOFOL 200 MG: 10 INJECTION, EMULSION INTRAVENOUS at 07:43

## 2025-01-31 RX ADMIN — HYDROMORPHONE HYDROCHLORIDE 0.5 MG: 1 INJECTION, SOLUTION INTRAMUSCULAR; INTRAVENOUS; SUBCUTANEOUS at 10:23

## 2025-01-31 RX ADMIN — FENTANYL CITRATE 50 MCG: 50 INJECTION, SOLUTION INTRAMUSCULAR; INTRAVENOUS at 08:01

## 2025-01-31 RX ADMIN — Medication 2 L/MIN: at 10:15

## 2025-01-31 RX ADMIN — Medication 50 MG: at 08:01

## 2025-01-31 RX ADMIN — GLYCOPYRROLATE 0.2 MG: 0.2 INJECTION, SOLUTION INTRAMUSCULAR; INTRAVENOUS at 09:01

## 2025-01-31 RX ADMIN — ROCURONIUM BROMIDE 70 MG: 10 INJECTION, SOLUTION INTRAVENOUS at 07:43

## 2025-01-31 RX ADMIN — SODIUM CHLORIDE, POTASSIUM CHLORIDE, SODIUM LACTATE AND CALCIUM CHLORIDE: 600; 310; 30; 20 INJECTION, SOLUTION INTRAVENOUS at 07:28

## 2025-01-31 RX ADMIN — HYDROMORPHONE HYDROCHLORIDE 0.5 MG: 1 INJECTION, SOLUTION INTRAMUSCULAR; INTRAVENOUS; SUBCUTANEOUS at 10:18

## 2025-01-31 ASSESSMENT — PAIN - FUNCTIONAL ASSESSMENT
PAIN_FUNCTIONAL_ASSESSMENT: 0-10
PAIN_FUNCTIONAL_ASSESSMENT: UNABLE TO SELF-REPORT
PAIN_FUNCTIONAL_ASSESSMENT: 0-10

## 2025-01-31 ASSESSMENT — PAIN SCALES - GENERAL
PAINLEVEL_OUTOF10: 2
PAINLEVEL_OUTOF10: 7
PAINLEVEL_OUTOF10: 2
PAINLEVEL_OUTOF10: 3
PAINLEVEL_OUTOF10: 3
PAINLEVEL_OUTOF10: 7
PAINLEVEL_OUTOF10: 3
PAINLEVEL_OUTOF10: 4
PAINLEVEL_OUTOF10: 7
PAINLEVEL_OUTOF10: 2

## 2025-01-31 ASSESSMENT — COLUMBIA-SUICIDE SEVERITY RATING SCALE - C-SSRS
2. HAVE YOU ACTUALLY HAD ANY THOUGHTS OF KILLING YOURSELF?: NO
1. IN THE PAST MONTH, HAVE YOU WISHED YOU WERE DEAD OR WISHED YOU COULD GO TO SLEEP AND NOT WAKE UP?: NO
6. HAVE YOU EVER DONE ANYTHING, STARTED TO DO ANYTHING, OR PREPARED TO DO ANYTHING TO END YOUR LIFE?: NO

## 2025-01-31 NOTE — PERIOPERATIVE NURSING NOTE
1119 Arrival to phase 2. Awake and alert. No complaints.     1142 Discharge instructions reviewed with pt and wife, verbalized understanding. PIV removed and pt assisted to get dressed.     1148 Transported to [facility] via wheelchair and discharged to home with wife.

## 2025-01-31 NOTE — OP NOTE
Robot Assisted Bilateral Inguinal Hernia Repair; Mesh Placement, Primary Repair Umbilical Hernia  Operative Note     Date: 2025  OR Location: [facility] A OR    Name: Eugene Ovalles, : 1970, Age: 54 y.o., MRN: [id number], Sex: male    Diagnosis  Pre-op Diagnosis      * Non-recurrent bilateral inguinal hernia without obstruction or gangrene, umbilical hernia  Post-op Diagnosis       * Non-recurrent bilateral inguinal hernia without obstruction or gangrene, umbilical hernia      Procedures  Robot Assisted Bilateral Inguinal Hernia Repair; Mesh Placement  01903 - [place] LAPAROSCOPY SURG RPR INITIAL INGUINAL HERNIA  Primary Repair Umbilical Hernia     Surgeons      * Nakul Moreland - Primary    Resident/Fellow/Other Assistant:  Surgeons and Role:  * No surgeons found with a matching role *    Staff:   Circulator: Margarita  Circulator: Nedra  Scrub Person: Michelle Daigle Scrub: Ayde    Anesthesia Staff: Anesthesiologist: Palmer May MD  C-AA: DORIS Segovia    Procedure Summary  Anesthesia: Anesthesia type not filed in the log.  ASA: II  Estimated Blood Loss: 10 mL  Intra-op Medications:   Administrations occurring from 0730 to 1030 on 25:   Medication Name Total Dose   BUPivacaine HCl (Marcaine) 0.5 % (5 mg/mL) 20 mL, lidocaine (Xylocaine) 20 mL syringe 14 mL   sterile water irrigation solution 500 mL   ceFAZolin (Ancef) vial 1 g 3 g   dexAMETHasone (Decadron) 4 mg/mL 8 mg   fentaNYL (Sublimaze) injection 50 mcg/mL 100 mcg   glycopyrrolate PF (Robinul) injection 0.2 mg   HYDROmorphone (Dilaudid) injection 1 mg/mL 1 mg   ketamine injection 50 mg/ 5 mL (10 mg/mL) 50 mg   LR bolus Cannot be calculated   lidocaine (Xylocaine) injection 2 % 100 mg   ondansetron 2 mg/mL 4 mg   phenylephrine 100 mcg/mL syringe 10 mL (prefilled) 200 mcg   propofol (Diprivan) injection 10 mg/mL 200 mg   rocuronium (ZeMuron) 50 mg/5 mL injection 110 mg   sugammadex (Bridion) 200 mg/2 mL injection 200 mg               Anesthesia Record               Intraprocedure I/O Totals          Intake    LR bolus 1200.00 mL    Total Intake 1200 mL       Output    Urine 250 mL    Est. Blood Loss 15 mL    Total Output 265 mL       Net    Net Volume 935 mL          Specimen:   ID Type Source Tests Collected by Time   1 : UMBILICAL HERNIA SAC Tissue HERNIA SAC SURGICAL PATHOLOGY EXAM Nakul Moreland MD 1/31/2025 0805                 Drains and/or Catheters:   [REMOVED] NG/OG/Feeding Tube OG - [place] sump 16 Fr Center mouth (Removed)       [REMOVED] Urethral Catheter Non-latex 16 Fr. (Removed)       Findings: bilateral direct inguinal hernia, small umbilical hernia     Indications: Eugene Ovalles is an 54 y.o. male who is having surgery for Non-recurrent bilateral inguinal hernia without obstruction or gangrene [K40.20].     The patient was seen in the preoperative area. The risks, benefits, complications, treatment options, non-operative alternatives, expected recovery and outcomes were discussed with the patient. The possibilities of reaction to medication, pulmonary aspiration, injury to surrounding structures, bleeding, recurrent infection, the need for additional procedures, failure to diagnose a condition, and creating a complication requiring transfusion or operation were discussed with the patient. The patient concurred with the proposed plan, giving informed consent.  The site of surgery was properly noted/marked if necessary per policy. The patient has been actively warmed in preoperative area. Preoperative antibiotics have been ordered and given within 1 hours of incision. Venous thrombosis prophylaxis have been ordered including bilateral sequential compression devices     Clinical Note:   Patient is a 54-year-old gentleman who is referred for evaluation treatment of bilateral inguinal hernias.  For approximately 1 year he has noted some pain in both the right and left groin regions.  Over the past several weeks the pain is  intensified, made worse with lifting and movement (right worse than left).  He was sent for CT scan that shows bilateral, fat-containing inguinal hernias as well as a small umbilical hernia.  He comes in for robotic bilateral upper inguinal hernia repair with mesh and repair of his umbilical hernia.  Risk, benefits and alternatives were discussed preoperatively and he agrees to the operation      DESCRIPTION OF PROCEDURE:    The patient was taken to the operating room, placed supine on the operating table.  Time-out was established.  General anesthesia was induced.  He was given antibiotics.  A Cage catheter was placed that was later removed after the procedure.  The abdomen was prepped and draped in a sterile fashion.  We made a supraumbilical midline incision.  He was noted to have a small umbilical hernia less than 2 cm.   Through this defect we  placed a 8 mm Lara trocar.   We established insufflation. We placed 8 mm ports in the left and right upper abdominal wall respectively.  The patient had been placed in the Trendelenburg position.  We then docked the robot to our ports.   I scrubbed out and performed the operation from a console in the corner of the room.      He was noted to have a moderate sized direct left inguinal hernia.   We scored the peritoneum just below the left inguinal ligament and very carefully took the peritoneum down from the left lower abdominal wall.  With very careful dissection we reduced the hernia sac and isolated the cord structures.  There was no obvious indirect component.  The entire myopectineal orifice was exposed.  We then introduced an extra large left-sided 3DMax mesh through our camera port, positioned this over the hernia defect, and secured it into place using interrupted 3-0 Vicryl stitches.  Sutures were placed anteriorly, laterally and at coopers ligament.  Next we re-peritonealized our repair by sewing the peritoneum up using 3-0 absorbable V-Loc suture.     He was  noted to have a moderate sized  direct right inguinal hernia.    We scored the peritoneum just below the right inguinal ligament and very carefully took the peritoneum down from the right lower abdominal wall.  With very careful dissection we reduced the hernia sac and isolated the cord structures.    There was no obvious indirect component.  The entire myopectineal orifice was exposed.  We then introduced a  large right-sided 3DMax mesh through our camera port, positioned this over the hernia defect, and secured it into place using interrupted  3-0 Vicryl stitches.  Sutures were placed anteriorly, laterally and at coopers ligament.  Next we re-peritonealized our repair by sewing the peritoneum up over the mesh using a 3-0 absorbable V-Loc suture.     We then removed our ports and then closed our midline fascial defect (small hernia defect) using 0 Vicryl suture placed in a figure of eight fashion.  Skin was reapproximated using 3-0 and 4-0 subcuticular Vicryl stitches followed by Dermabond glue.  The patient tolerated the procedure without difficulty and was returned to the recovery room in stable condition.       Complications:  None; patient tolerated the procedure well.    Disposition: PACU - hemodynamically stable.  Condition: stable         Attending Attestation: I was present and scrubbed for the entire procedure.    Nakul Moreland  Phone Number: [phone]

## 2025-01-31 NOTE — ANESTHESIA POSTPROCEDURE EVALUATION
Patient: Eugene Ovalles    Procedure Summary       Date: 01/31/25 Room / Location: [facility] OR 08 / [facility] A OR    Anesthesia Start: 0728 Anesthesia Stop: 1018    Procedure: Robot Assisted Bilateral Inguinal Hernia Repair; Mesh Placement (Bilateral: Abdomen) Diagnosis:       Non-recurrent bilateral inguinal hernia without obstruction or gangrene      (Non-recurrent bilateral inguinal hernia without obstruction or gangrene [K40.20])    Surgeons: Nakul Moreland MD Responsible Provider: Palmer May MD    Anesthesia Type: general ASA Status: 2            Anesthesia Type: general    Vitals Value Taken Time   /59 01/31/25 1102   Temp 36.3 °C (97.3 °F) 01/31/25 1100   Pulse 66 01/31/25 1115   Resp 15 01/31/25 1100   SpO2 98 % 01/31/25 1115   Vitals shown include unfiled device data.    Anesthesia Post Evaluation    Patient location during evaluation: bedside  Patient participation: complete - patient participated  Level of consciousness: awake  Pain management: adequate  Multimodal analgesia pain management approach  Airway patency: patent  Cardiovascular status: stable  Respiratory status: spontaneous ventilation and unassisted  Hydration status: acceptable  Postoperative Nausea and Vomiting: none  Comments: No significant PONV.        No notable events documented.

## 2025-01-31 NOTE — ANESTHESIA PREPROCEDURE EVALUATION
Patient: Eugene Ovalles    Procedure Information       Date/Time: 01/31/25 0730    Procedure: Robot Assisted Bilateral Inguinal Hernia Repair; Mesh Placement (Bilateral: Abdomen)    Location: [facility] A OR 08 / Virtual [facility] A OR    Surgeons: Nakul Moreland MD            Relevant Problems   Cardiac   (+) Benign essential hypertension   (+) Hyperlipemia, mixed      Pulmonary   (+) Asthma      GI   (+) Rectal bleeding       Clinical information reviewed:   Tobacco  Allergies  Meds   Med Hx  Surg Hx   Fam Hx  Soc Hx        NPO Detail:  NPO/Void Status  Date of Last Liquid: 01/30/25  Time of Last Liquid: 2300  Date of Last Solid: 01/30/25  Time of Last Solid: 2200         Physical Exam    Airway  Mallampati: II  TM distance: >3 FB  Neck ROM: full     Cardiovascular   Rhythm: regular  Rate: normal     Dental    Pulmonary   Breath sounds clear to auscultation     Abdominal            Anesthesia Plan    History of general anesthesia?: yes  History of complications of general anesthesia?: no    ASA 2     general     intravenous induction   Anesthetic plan and risks discussed with patient.    Plan discussed with CRNA.

## 2025-01-31 NOTE — ANESTHESIA PROCEDURE NOTES
Airway  Date/Time: 1/31/2025 7:45 AM  Urgency: elective    Airway not difficult    Staffing  Performed: DORIS   Authorized by: Palmer May MD    Performed by: DORIS Segovia  Patient location during procedure: OR    Indications and Patient Condition  Indications for airway management: anesthesia and airway protection  Spontaneous Ventilation: absent  Sedation level: deep  Preoxygenated: yes  Patient position: sniffing  MILS not maintained throughout  Mask difficulty assessment: 1 - vent by mask  Planned trial extubation    Final Airway Details  Final airway type: endotracheal airway      Successful airway: ETT  Cuffed: yes   Successful intubation technique: direct laryngoscopy  Endotracheal tube insertion site: oral  Blade: Conrad  Blade size: #4  ETT size (mm): 7.5  Cormack-Lehane Classification: grade IIb - view of arytenoids or posterior of glottis only  Placement verified by: chest auscultation, capnometry and palpation of cuff   Measured from: teeth  ETT to teeth (cm): 23  Number of attempts at approach: 1

## 2025-02-03 ASSESSMENT — PAIN SCALES - GENERAL: PAINLEVEL_OUTOF10: 4

## 2025-02-07 LAB
LABORATORY COMMENT REPORT: NORMAL
PATH REPORT.FINAL DX SPEC: NORMAL
PATH REPORT.GROSS SPEC: NORMAL
PATH REPORT.RELEVANT HX SPEC: NORMAL
PATH REPORT.TOTAL CANCER: NORMAL

## 2025-02-14 ENCOUNTER — OFFICE VISIT (OUTPATIENT)
Dept: SURGERY | Facility: CLINIC | Age: 55
End: 2025-02-14
Payer: COMMERCIAL

## 2025-02-14 VITALS
BODY MASS INDEX: 36.88 KG/M2 | HEART RATE: 70 BPM | OXYGEN SATURATION: 97 % | WEIGHT: 263.4 LBS | SYSTOLIC BLOOD PRESSURE: 132 MMHG | DIASTOLIC BLOOD PRESSURE: 86 MMHG | HEIGHT: 71 IN

## 2025-02-14 DIAGNOSIS — Z09 FOLLOW-UP EXAM: Primary | ICD-10-CM

## 2025-02-14 PROCEDURE — 1036F TOBACCO NON-USER: CPT | Performed by: SURGERY

## 2025-02-14 PROCEDURE — 3079F DIAST BP 80-89 MM HG: CPT | Performed by: SURGERY

## 2025-02-14 PROCEDURE — 3075F SYST BP GE 130 - 139MM HG: CPT | Performed by: SURGERY

## 2025-02-14 PROCEDURE — 3008F BODY MASS INDEX DOCD: CPT | Performed by: SURGERY

## 2025-02-14 PROCEDURE — 99211 OFF/OP EST MAY X REQ PHY/QHP: CPT | Performed by: SURGERY

## 2025-02-14 ASSESSMENT — PAIN SCALES - GENERAL: PAINLEVEL_OUTOF10: 2

## 2025-02-14 NOTE — PROGRESS NOTES
Mr. Eugene Ovalles is a 54 y.o. year old male who comes in today for follow-up after undergoing robotic bilateral inguinal hernia repair with mesh.  This procedure was performed on 1/31/25.    Patient is doing very well and is pleased with the outcome of the surgery. Pain and swelling are mild and improving.    Tolerating a regular diet, bowel movements are normal.    On exam patient looks well.    Incisions are healing very nicely. No signs of infection. Mild tenderness around left abdominal incision.    Impression: Doing well following robotic bilateral inguinal hernia repair with mesh.    Discussed increasing activity level.    Wrote a note returning him to his job     Follow-up with me as needed.

## 2025-02-14 NOTE — LETTER
February 14, 2025     Patient: Eugene Ovalles   YOB: 1970   Date of Visit: 2/14/2025     To Whom It May Concern:    I am seeing Mr. Eugene Ovalles in my office today after undergoing recent surgery.  He is progressing very well but I do recommend that he remain off of work until at least Monday, March 10, 2025.   At that time he may return to work without restriction of duty.    If you have any questions or concerns, please don't hesitate to call.         Sincerely,        Nakul Moreland MD  [phone]    CC: No Recipients

## 2025-02-14 NOTE — LETTER
February 14, 2025     Arnold Gee DO  [address]  [facility], [address]  [facility] [address]    Patient: Eugene Ovalles   YOB: 1970   Date of Visit: 2/14/2025       Dear Dr. Arnold Gee, :    Thank you for referring Eugene Ovalles to me for evaluation. Below are my notes for this consultation.  If you have questions, please do not hesitate to call me. I look forward to following your patient along with you.       Sincerely,     Nakul Moreland MD      CC: No Recipients  ______________________________________________________________________________________       Mr. Eugene Ovalles is a 54 y.o. year old male who comes in today for follow-up after undergoing robotic bilateral inguinal hernia repair with mesh.  This procedure was performed on 1/31/25.    Patient is doing very well and is pleased with the outcome of the surgery. Pain and swelling are mild and improving.    Tolerating a regular diet, bowel movements are normal.    On exam patient looks well.    Incisions are healing very nicely. No signs of infection. Mild tenderness around left abdominal incision.    Impression: Doing well following robotic bilateral inguinal hernia repair with mesh.    Discussed increasing activity level.    Wrote a note returning him to his job     Follow-up with me as needed.

## 2025-03-13 DIAGNOSIS — M62.830 SPASM OF LUMBAR PARASPINOUS MUSCLE: ICD-10-CM

## 2025-03-14 RX ORDER — MELOXICAM 7.5 MG/1
15 TABLET ORAL DAILY
Qty: 180 TABLET | Refills: 0 | OUTPATIENT
Start: 2025-03-14

## 2025-07-07 DIAGNOSIS — N52.9 INABILITY TO ATTAIN ERECTION: ICD-10-CM

## 2025-07-07 DIAGNOSIS — I10 BENIGN ESSENTIAL HYPERTENSION: ICD-10-CM

## 2025-07-08 RX ORDER — SILDENAFIL 100 MG/1
TABLET, FILM COATED ORAL
Qty: 20 TABLET | Refills: 0 | Status: SHIPPED | OUTPATIENT
Start: 2025-07-08 | End: 2025-07-10 | Stop reason: SDUPTHER

## 2025-07-08 RX ORDER — OLMESARTAN MEDOXOMIL AND HYDROCHLOROTHIAZIDE 40/25 40; 25 MG/1; MG/1
1 TABLET ORAL DAILY
Qty: 30 TABLET | Refills: 0 | Status: SHIPPED | OUTPATIENT
Start: 2025-07-08 | End: 2025-07-10 | Stop reason: SDUPTHER

## 2025-07-10 ENCOUNTER — OFFICE VISIT (OUTPATIENT)
Dept: PRIMARY CARE | Facility: CLINIC | Age: 55
End: 2025-07-10
Payer: COMMERCIAL

## 2025-07-10 VITALS
WEIGHT: 257 LBS | DIASTOLIC BLOOD PRESSURE: 88 MMHG | SYSTOLIC BLOOD PRESSURE: 130 MMHG | BODY MASS INDEX: 35.98 KG/M2 | OXYGEN SATURATION: 93 % | HEART RATE: 95 BPM | HEIGHT: 71 IN

## 2025-07-10 DIAGNOSIS — N52.9 INABILITY TO ATTAIN ERECTION: ICD-10-CM

## 2025-07-10 DIAGNOSIS — Z12.5 PROSTATE CANCER SCREENING: ICD-10-CM

## 2025-07-10 DIAGNOSIS — Z00.00 HEALTHCARE MAINTENANCE: Primary | ICD-10-CM

## 2025-07-10 DIAGNOSIS — K29.50 CHRONIC GASTRITIS WITHOUT BLEEDING, UNSPECIFIED GASTRITIS TYPE: ICD-10-CM

## 2025-07-10 DIAGNOSIS — Z13.1 SCREENING FOR DIABETES MELLITUS: ICD-10-CM

## 2025-07-10 DIAGNOSIS — I10 BENIGN ESSENTIAL HYPERTENSION: ICD-10-CM

## 2025-07-10 DIAGNOSIS — Z13.6 SCREENING FOR HEART DISEASE: ICD-10-CM

## 2025-07-10 DIAGNOSIS — E78.2 HYPERLIPEMIA, MIXED: ICD-10-CM

## 2025-07-10 DIAGNOSIS — Z13.0 SCREENING FOR DISORDER OF BLOOD AND BLOOD-FORMING ORGANS: ICD-10-CM

## 2025-07-10 DIAGNOSIS — Z13.29 SCREENING FOR THYROID DISORDER: ICD-10-CM

## 2025-07-10 RX ORDER — ROSUVASTATIN CALCIUM 40 MG/1
40 TABLET, COATED ORAL DAILY
Qty: 90 TABLET | Refills: 1 | Status: SHIPPED | OUTPATIENT
Start: 2025-07-10 | End: 2026-01-06

## 2025-07-10 RX ORDER — SILDENAFIL 100 MG/1
100 TABLET, FILM COATED ORAL AS NEEDED
Qty: 20 TABLET | Refills: 0 | Status: SHIPPED | OUTPATIENT
Start: 2025-07-10

## 2025-07-10 RX ORDER — OLMESARTAN MEDOXOMIL AND HYDROCHLOROTHIAZIDE 40/25 40; 25 MG/1; MG/1
1 TABLET ORAL DAILY
Qty: 30 TABLET | Refills: 0 | Status: SHIPPED | OUTPATIENT
Start: 2025-07-10 | End: 2025-08-09

## 2025-07-10 RX ORDER — TADALAFIL 5 MG/1
5 TABLET ORAL DAILY
Qty: 30 TABLET | Refills: 5 | Status: SHIPPED | OUTPATIENT
Start: 2025-07-10 | End: 2026-01-06

## 2025-07-10 RX ORDER — OMEPRAZOLE 40 MG/1
40 CAPSULE, DELAYED RELEASE ORAL
Qty: 90 CAPSULE | Refills: 1 | Status: SHIPPED | OUTPATIENT
Start: 2025-07-10 | End: 2026-01-06

## 2025-07-10 NOTE — ASSESSMENT & PLAN NOTE
Patient has long history of hypertension treated with Benicar-HCT 40-25 mg daily.  He is tolerating medication well, denies side effects  Blood pressure today in office is 130/88  He denies any cardiovascular symptoms including lightheadedness, visual disturbance or headaches

## 2025-07-10 NOTE — PROGRESS NOTES
Subjective   Patient ID: Eugene Ovalles is a 55 y.o. male who presents for Med Refill.  Today he is accompanied by alone.     HPI  # Health maintenance  Overall patient is doing well.   Immunization: Tdap recommended; influenza vaccine recommended  Shingrix recommended; PCV20 recommended  COVID-19 vaccine (Pfizer Moderna) 2 doses:  Colon Cancer Screening: No family history, colonoscopy in 2023 with 5 year clearance  Diet: Regular diet  Exercise: Exercises daily  Tobacco: Denies use  EtOH: Rarely Socially     Other problems/diagnoses addressed and reviewed during this encounter    1.  Hypertension  Patient has long history of hypertension treated with Benicar-HCT 40-25 mg daily.  He is tolerating medication well, denies side effects  Blood pressure today in office is 130/88  He denies any cardiovascular symptoms including lightheadedness, visual disturbance or headaches    2.  Hyperlipidemia  He has history of hyperlipidemia treated with Crestor 40 mg daily.  He is tolerating medication well and requesting refills.  Most recent lipid panel on file showed Cholesterol 257, HDL 58, ,      3.  Epigastric pain/bloating  Patient is experiencing recurrent epigastric pain associated with bloating.  According to him the pain moves around, and at times he is in the lower abdomen and left lower quadrant associated with sensation of needing to have a bowel movement.  Admits of having excessive flatulence.  Was seen in this office in January and CT abdomen showed small umbilical hernia and bilateral inguinal hernias which subsequently were repaired by surgeon this past.  Despite hernia repair his symptoms continue to be recurrent    4.  Erectile dysfunction  He has a history of RUCHI and uses Cialis 5 mg daily and Viagra 100 mg as needed  Denies any medication side effects and requesting refills    Medications Ordered Prior to Encounter[1]     Allergies[2]    Immunization History   Administered Date(s) Administered  "   COVID-19, mRNA, LNP-S, PF, 30 mcg/0.3 mL dose 08/04/2021, 08/26/2021         Review of Systems  All pertinent positive symptoms are included in the history of present illness.  All other systems have been reviewed and are negative and noncontributory to this patient's current ailments.     Objective   /88   Pulse 95   Ht 1.803 m (5' 11\")   Wt 117 kg (257 lb)   SpO2 93%   BMI 35.84 kg/m²   BSA: 2.42 meters squared  No visits with results within 1 Month(s) from this visit.   Latest known visit with results is:   Admission on 01/31/2025, Discharged on 01/31/2025   Component Date Value Ref Range Status    Case Report 01/31/2025    Final                    Value:Surgical Pathology                                Case: [id number]                                  Authorizing Provider:  Nakul Moreland MD          Collected:           01/31/2025 0805              Ordering Location:     [facility] OR Received:            01/31/2025 1104              Pathologist:           Shiraz Pickard MD                                                           Specimen:    HERNIA SAC, UMBILICAL HERNIA SAC                                                           FINAL DIAGNOSIS 01/31/2025    Final                    Value:A. UMBILICAL HERNIA SAC:   Fibrovascular and adipose tissue, clinically umbilical hernia sac.          01/31/2025    Final                    Value:By the signature on this report, the individual or group listed as making the Final Interpretation/Diagnosis certifies that they have reviewed this case.       Clinical History 01/31/2025    Final                    Value:Pre-op diagnosis:  Non-recurrent bilateral inguinal hernia without obstruction or gangrene [K40.20]      Gross Description 01/31/2025    Final                    Value:A: Received in formalin, labeled with the patient's name and hospital number and \"umbilical hernia sac\", is a fibrofatty and membraneous segment  of tissue measuring " 3.4 x 2.5 x 1.9 cm.  Areas of induration, nodularity, hemorrhage, and necrosis are not grossly identified.  Representative sections are submitted in one cassette.  [facility]         Physical Exam  Constitutional:       General: He is not in acute distress.     Appearance: Normal appearance. He is obese. He is not ill-appearing.   HENT:      Head: Normocephalic and atraumatic.      Right Ear: Ear canal and external ear normal. There is no impacted cerumen.      Left Ear: Ear canal normal. There is no impacted cerumen.      Nose: No congestion or rhinorrhea.      Mouth/Throat:      Mouth: Mucous membranes are moist.      Pharynx: Oropharynx is clear. No oropharyngeal exudate or posterior oropharyngeal erythema.   Eyes:      General: No scleral icterus.     Extraocular Movements: Extraocular movements intact.      Conjunctiva/sclera: Conjunctivae normal.      Pupils: Pupils are equal, round, and reactive to light.   Cardiovascular:      Rate and Rhythm: Normal rate and regular rhythm.      Pulses: Normal pulses.      Heart sounds: Normal heart sounds. No murmur heard.  Pulmonary:      Effort: Pulmonary effort is normal. No respiratory distress.      Breath sounds: Normal breath sounds. No wheezing, rhonchi or rales.   Abdominal:      General: Abdomen is flat. Bowel sounds are normal.      Palpations: Abdomen is soft.      Tenderness: There is no abdominal tenderness. There is no guarding or rebound.      Hernia: No hernia is present.      Comments: Obese abdomen  Surgical scar present in the left lower quadrant   Musculoskeletal:         General: Normal range of motion.      Right lower leg: No edema.      Left lower leg: No edema.   Skin:     General: Skin is warm.      Capillary Refill: Capillary refill takes less than 2 seconds.      Findings: No lesion or rash.   Neurological:      General: No focal deficit present.      Mental Status: He is alert and oriented to person, place, and time.   Psychiatric:         Mood and  Affect: Mood normal.         Behavior: Behavior normal.           Assessment/Plan   Diagnoses and all orders for this visit:  Healthcare maintenance  -     Prostate Specific Antigen; Future  -     TSH with reflex to Free T4 if abnormal; Future  -     Lipid Panel; Future  -     Comprehensive Metabolic Panel; Future  -     CBC and Auto Differential; Future  -     Hemoglobin A1C; Future  Complete history and physical examination was performed  Requisition for routine lab work was ordered today  We will notify of test results once available and make treatment recommendations accordingly  Recommended proper immunization which was deferred to a later time  Up-to-date with colon cancer screening  Instructed about importance of conducting a healthy lifestyle, following well-balanced diet and exercising regularly    Prostate cancer screening  -     Prostate Specific Antigen; Future  Screening for thyroid disorder  -     TSH with reflex to Free T4 if abnormal; Future  Screening for heart disease  -     Lipid Panel; Future  Screening for disorder of blood and blood-forming organs  -     CBC and Auto Differential; Future  Screening for diabetes mellitus  -     Hemoglobin A1C; Future    Benign essential hypertension  -     olmesartan-hydrochlorothiazide (BENIcar HCT) 40-25 mg tablet; Take 1 tablet by mouth once daily.  Stable, we will continue same regimen    Hyperlipemia, mixed  -     rosuvastatin (Crestor) 40 mg tablet; Take 1 tablet (40 mg) by mouth once daily.  Stable, will continue same regimen, will discuss CT cardiac calcium score next year    Chronic gastritis without bleeding, unspecified gastritis type/indigestion/bloating  -     omeprazole (PriLOSEC) 40 mg DR capsule; Take 1 capsule (40 mg) by mouth once daily in the morning. Take before meals. Do not crush or chew.  Will start a trial of omeprazole 40 mg daily  If symptoms persist for more than 4-6 weeks, considering referral to GI for further investigation and  possible treatment of likely functional indigestion.    Inability to attain erection  -     tadalafil (Cialis) 5 mg tablet; Take 1 tablet (5 mg) by mouth once daily.  -     sildenafil (Viagra) 100 mg tablet; Take 1 tablet (100 mg) by mouth if needed for erectile dysfunction.  Stable, refill sent to his pharmacy.    Thank you for letting us be a part of your care team.  Please call the office if you have further questions or concerns regarding your care    Otherwise, please follow-up in 6 months for continued care and refills as well as your yearly physical examination    Zoya Shah MD  PGY3,  Resident  07/10/25         [1]   Current Outpatient Medications on File Prior to Visit   Medication Sig Dispense Refill    chlorhexidine (Peridex) 0.12 % solution Swish for 30 seconds and spit 15mL of solution the night before and morning of surgery 475 mL 0    ibuprofen 800 mg tablet Take 1 tablet (800 mg) by mouth 3 times a day as needed for mild pain (1 - 3) (pain). 30 tablet 0    oxyCODONE (Roxicodone) 5 mg immediate release tablet Take 1 tablet (5 mg) by mouth every 6 hours if needed for severe pain (7 - 10) for up to 12 doses. 12 tablet 0    polyethylene glycol-electrolytes 420 gram solution drink half by mouth beginning the night before the procedure and start drinking the 2nd half 5 hours before procedure time      [DISCONTINUED] olmesartan-hydrochlorothiazide (BENIcar HCT) 40-25 mg tablet Take 1 tablet by mouth once daily. 30 tablet 0    [DISCONTINUED] sildenafil (Viagra) 100 mg tablet TAKE ONE TABLET BY MOUTH EVERY DAY AS NEEDED for erectile dysfunction 20 tablet 0    [DISCONTINUED] olmesartan-hydrochlorothiazide (BENIcar HCT) 40-25 mg tablet Take 1 tablet by mouth once daily. 30 tablet 3    [DISCONTINUED] rosuvastatin (Crestor) 40 mg tablet Take 1 tablet (40 mg) by mouth once daily. 90 tablet 1    [DISCONTINUED] sildenafil (Viagra) 100 mg tablet Take 1 tablet (100 mg) by mouth once daily as needed for erectile  dysfunction. 20 tablet 5    [DISCONTINUED] tadalafil (Cialis) 5 mg tablet Take 1 tablet (5 mg) by mouth once daily. 30 tablet 5     No current facility-administered medications on file prior to visit.   [2] No Known Allergies

## (undated) DEVICE — TRAY, FOLEY, LUBRI-SIL, 16FR, COMPLETE CARE W/STATLOCK

## (undated) DEVICE — SUTURE, MONOCRYL, 4-0, 18 IN, PS2, UNDYED

## (undated) DEVICE — DRAPE, ARM XI

## (undated) DEVICE — SEAL, UNIVERSAL, 5-12MM

## (undated) DEVICE — GLOVE, SURGICAL, PROTEXIS PI BLUE W/NEUTHERA, 8.0, PF, LF

## (undated) DEVICE — SCISSORS, MONOPOLAR, CURVED, 8MM

## (undated) DEVICE — SUTURE, VICRYL PLUS 3-0, SH, 27IN

## (undated) DEVICE — ADHESIVE, SKIN, DERMABOND ADVANCED, 15CM, PEN-STYLE

## (undated) DEVICE — LUBRICANT, ELECTROLUBE, F/ELECTRODE TIPS

## (undated) DEVICE — DRAPE, COLUMN, DAVINCI XI

## (undated) DEVICE — SOLUTION, IRRIGATION, STERILE WATER, 1000 ML, POUR BOTTLE

## (undated) DEVICE — Device

## (undated) DEVICE — FORCEPS, PROGRASP, DAVINCI XI

## (undated) DEVICE — DRIVER, MEGA NEEDLE

## (undated) DEVICE — OBTURATOR, BLADELESS , SU

## (undated) DEVICE — TUBE SET, PNEUMOLAR HEATED, SMOKE EVACU, HIGH-FLOW

## (undated) DEVICE — SUTURE, VICRYL, 0, 27 IN, UR-6, VIOLET

## (undated) DEVICE — SUTURE, STRATAFIX, 3-0 SPIRAL PDS PLUS, 15CM SH VIOLET

## (undated) DEVICE — TROCAR SYSTEM, BALLOON, KII GELPORT, 12 X 100MM

## (undated) DEVICE — COVER, TIP HOT SHEARS ENDOWRIST

## (undated) DEVICE — CARE KIT, LAPAROSCOPIC, ADVANCED

## (undated) DEVICE — SHEAR, W/UNIPOLAR CAUTERY, ENDOSHEAR, 5 MM